# Patient Record
Sex: FEMALE | Race: WHITE | HISPANIC OR LATINO | Employment: UNEMPLOYED | URBAN - METROPOLITAN AREA
[De-identification: names, ages, dates, MRNs, and addresses within clinical notes are randomized per-mention and may not be internally consistent; named-entity substitution may affect disease eponyms.]

---

## 2022-03-16 ENCOUNTER — APPOINTMENT (OUTPATIENT)
Dept: OBGYN | Facility: CLINIC | Age: 22
End: 2022-03-16

## 2022-03-16 ENCOUNTER — GYNECOLOGY VISIT (OUTPATIENT)
Dept: OBGYN | Facility: CLINIC | Age: 22
End: 2022-03-16

## 2022-03-16 VITALS — WEIGHT: 201 LBS | SYSTOLIC BLOOD PRESSURE: 102 MMHG | DIASTOLIC BLOOD PRESSURE: 64 MMHG

## 2022-03-16 DIAGNOSIS — Z32.01 ENCOUNTER FOR PREGNANCY TEST, RESULT POSITIVE: ICD-10-CM

## 2022-03-16 DIAGNOSIS — N92.6 MISSED MENSES: ICD-10-CM

## 2022-03-16 PROCEDURE — 76830 TRANSVAGINAL US NON-OB: CPT | Mod: TC | Performed by: OBSTETRICS & GYNECOLOGY

## 2022-03-16 PROCEDURE — 99203 OFFICE O/P NEW LOW 30 MIN: CPT | Mod: 25 | Performed by: OBSTETRICS & GYNECOLOGY

## 2022-03-16 PROCEDURE — 8199 PREG CTR HIGH RISK PKG RATE (SYSTEM): Performed by: OBSTETRICS & GYNECOLOGY

## 2022-03-16 NOTE — PROGRESS NOTES
Cc: Confirmation of pregnancy    HPI:  The patient is a 21 y.o.  here for confirmation of pregnancy exam.     Fetal movement denies   Vaginal bleeding denies    Leakage of fluid denies    Cramping denies   Nausea/vomiting: reports mild nausea and vomiting  HA  denies   Dysuria  denies     Review of systems:  Pertinent positives documented in HPI and all other systems reviewed & are negative    Gyn History:   LMP unsure  No hx STDs    Pregnancy related complications:    OB History    Para Term  AB Living   2 1 1     1   SAB IAB Ectopic Molar Multiple Live Births             1      # Outcome Date GA Lbr Tim/2nd Weight Sex Delivery Anes PTL Lv   2 Current            1 Term 17 41w0d  2.722 kg (6 lb) M Vag-Spont  N NEYDA      Birth Comments: [place]     History reviewed. No pertinent past medical history.  History reviewed. No pertinent surgical history.  Social History     Socioeconomic History   • Marital status: Single     Spouse name: Not on file   • Number of children: Not on file   • Years of education: Not on file   • Highest education level: Not on file   Occupational History   • Not on file   Tobacco Use   • Smoking status: Never Smoker   • Smokeless tobacco: Never Used   Vaping Use   • Vaping Use: Never used   Substance and Sexual Activity   • Alcohol use: Not Currently   • Drug use: Never   • Sexual activity: Yes     Partners: Male     Birth control/protection: Pill   Other Topics Concern   • Not on file   Social History Narrative   • Not on file     Social Determinants of Health     Financial Resource Strain: Not on file   Food Insecurity: Not on file   Transportation Needs: Not on file   Physical Activity: Not on file   Stress: Not on file   Social Connections: Not on file   Intimate Partner Violence: Not on file   Housing Stability: Not on file     Family History   Problem Relation Age of Onset   • No Known Problems Mother    • No Known Problems Father      Allergies:   Allergies  as of 2022   • (No Known Allergies)       PE:    /64   Wt 91.2 kg (201 lb)     General:appears stated age, is in no apparent distress  Head: normocephalic, non-tender  Neck: neck is supple, no jugular venous distension  Abdomen: Bowel sounds positive, nondistended, soft, nontender x4, no rebound or guarding. No organomegaly. No masses.  Female GYN: normal female external genitalia without lesions     Skin: No rashes, or ulcers or lesions seen  Psychiatric: Patient shows appropriate affect, is alert and oriented x3, intact judgment and insight.    transvaginal scan and per my read:    Indication: missed menses, positive pregnancy test.   LMP unknown    Findings: morgan intrauterine pregnancy @ 12-6 weeks by CRL. Positive yolk sac. Positive fetal cardiac activity @ 153 BPM. Right ovary not seen. Left Ovary not seen. Cervical length 3.72cm.   No free fluid in the cul-de-sac.    Impression: viable IUP @ 12-6 weeks. EDC by US of 2022    DATIN2022 by todays US as per ACOG guidelines.    A/P:   1. Encounter for pregnancy test, result positive     2. Missed menses         # Newly diagnosed pregnancy  Discussed course of pregnancy; 20 week US normally when gender is known.   SAB precautions discussed  Increase water intake and encouraged healthy nutrition.  Begin prenatal vitamins.    Spent 30 minutes in face-to-face patient contact in which greater than 50% of that visit was spent in counseling/coordination of care of newly diagnosed pregnancy including medical and surgical options of care.    F/u in 4 weeks for new OB visit

## 2022-03-16 NOTE — NON-PROVIDER
Pt here for DUB.    Pt states she has a pain in her legs, also having nausea and vomiting   Good# [phone]  LMP: Sometime in December , 0w0d today.   Pharmacy confirmed

## 2022-03-17 ENCOUNTER — OFFICE VISIT (OUTPATIENT)
Dept: OBGYN | Facility: CLINIC | Age: 22
End: 2022-03-17
Payer: MEDICAID

## 2022-04-08 ENCOUNTER — INITIAL PRENATAL (OUTPATIENT)
Dept: OBGYN | Facility: CLINIC | Age: 22
End: 2022-04-08

## 2022-04-08 ENCOUNTER — HOSPITAL ENCOUNTER (OUTPATIENT)
Facility: MEDICAL CENTER | Age: 22
End: 2022-04-08
Attending: NURSE PRACTITIONER
Payer: COMMERCIAL

## 2022-04-08 VITALS — WEIGHT: 206.2 LBS | SYSTOLIC BLOOD PRESSURE: 108 MMHG | DIASTOLIC BLOOD PRESSURE: 66 MMHG

## 2022-04-08 DIAGNOSIS — Z34.82 ENCOUNTER FOR SUPERVISION OF OTHER NORMAL PREGNANCY, SECOND TRIMESTER: Primary | ICD-10-CM

## 2022-04-08 LAB
APPEARANCE UR: NORMAL
BILIRUB UR STRIP-MCNC: NORMAL MG/DL
COLOR UR AUTO: NORMAL
GLUCOSE UR STRIP.AUTO-MCNC: NEGATIVE MG/DL
KETONES UR STRIP.AUTO-MCNC: NEGATIVE MG/DL
LEUKOCYTE ESTERASE UR QL STRIP.AUTO: NORMAL
NITRITE UR QL STRIP.AUTO: NEGATIVE
PH UR STRIP.AUTO: 7 [PH] (ref 5–8)
PROT UR QL STRIP: NEGATIVE MG/DL
RBC UR QL AUTO: NEGATIVE
SP GR UR STRIP.AUTO: 1.02
UROBILINOGEN UR STRIP-MCNC: NORMAL MG/DL

## 2022-04-08 PROCEDURE — 81002 URINALYSIS NONAUTO W/O SCOPE: CPT | Performed by: NURSE PRACTITIONER

## 2022-04-08 PROCEDURE — 59402 PR NEW OB HIGH RISK: CPT | Performed by: NURSE PRACTITIONER

## 2022-04-08 RX ORDER — FOLIC ACID 1 MG/1
1 TABLET ORAL DAILY
COMMUNITY
End: 2022-09-08

## 2022-04-08 ASSESSMENT — ENCOUNTER SYMPTOMS
CONSTITUTIONAL NEGATIVE: 1
RESPIRATORY NEGATIVE: 1
MUSCULOSKELETAL NEGATIVE: 1
EYES NEGATIVE: 1
NEUROLOGICAL NEGATIVE: 1
GASTROINTESTINAL NEGATIVE: 1
PSYCHIATRIC NEGATIVE: 1
CARDIOVASCULAR NEGATIVE: 1

## 2022-04-08 NOTE — PROGRESS NOTES
S:  Jes Milner is a 21 y.o.  who presents for her new OB exam.  She is 16w1d with and SHARONDA of Estimated Date of Delivery: 22 based off of US . She has no complaints.  She is currently not working. Discussed heavy lifting and chemical exposure. No ER visits or previous care in this pregnancy.     Unsure about AFP.  Declines CF.  Denies VB, LOF, or cramping.  Denies dysuria, vaginal DC. Reports some fetal movement.     Pt is single and lives with boyfriend and child. This is a different FOB than her first pregnancy.  Pregnancy is unplanned but desired.  She has a history of 1 full term  without any pregnancy or delivery complications.      Discussed diet and exercise during pregnancy. Encouraged good nutrition, and daily exercise including walking or swimming. Discussed expected weight gain during pregnancy. Discussed adequate hydration during pregnancy.    History reviewed. No pertinent past medical history.  Family History   Problem Relation Age of Onset   • No Known Problems Mother    • No Known Problems Father    • No Known Problems Sister    • No Known Problems Brother    • No Known Problems Brother      Social History     Socioeconomic History   • Marital status: Single     Spouse name: Not on file   • Number of children: Not on file   • Years of education: Not on file   • Highest education level: Not on file   Occupational History   • Not on file   Tobacco Use   • Smoking status: Never Smoker   • Smokeless tobacco: Never Used   Vaping Use   • Vaping Use: Never used   Substance and Sexual Activity   • Alcohol use: Not Currently   • Drug use: Never   • Sexual activity: Yes     Partners: Male     Birth control/protection: Pill   Other Topics Concern   • Not on file   Social History Narrative   • Not on file     Social Determinants of Health     Financial Resource Strain: Not on file   Food Insecurity: Not on file   Transportation Needs: Not on file   Physical Activity: Not on  file   Stress: Not on file   Social Connections: Not on file   Intimate Partner Violence: Not on file   Housing Stability: Not on file     OB History    Para Term  AB Living   2 1 1     1   SAB IAB Ectopic Molar Multiple Live Births             1      # Outcome Date GA Lbr Tim/2nd Weight Sex Delivery Anes PTL Lv   2 Current            1 Term 17 41w0d  2.722 kg (6 lb) M Vag-Spont  N NEYDA      Birth Comments: [place]       History of Varicella Virus: no  History of HSV I or II in self or partner: no  History of Thyroid problems: no    O:  /66   Wt 93.5 kg (206 lb 3.2 oz)    See Prenatal Physical.    Wet mount: deferred, no s/sx  Chaperone offered: yes    A:   1.  IUP @ 16w1d per US        2.  S=D        3.  See problem list below               Patient Active Problem List    Diagnosis Date Noted   • Encounter for supervision of other normal pregnancy, second trimester 2022         P:  1.  GC/CT & pap done        2.  Prenatal labs ordered - lab slip given        3.  Discussed PNV, diet, avoidances and adequate water intake        4.  NOB packet given        5.  Return to office in 4 wks        6.  Complete OB US in 3-4 wks        7.  AFP to be discussed next visit, unsure    Orders Placed This Encounter   • US-OB 2ND 3RD TRI COMPLETE   • PRENATAL PANEL 3+HIV+HCV   • URINALYSIS,CULTURE IF INDICATED   • URINE DRUG SCREEN W/CONF (AR)   • THINPREP RFLX HPV ASCUS W/CTNG   • POCT Urinalysis   • Prenatal MV-Min-Fe Fum-FA-DHA (PRENATAL 1 PO)   • folic acid (FOLVITE) 1 MG Tab       HPI    Review of Systems   Constitutional: Negative.    HENT: Negative.    Eyes: Negative.    Respiratory: Negative.    Cardiovascular: Negative.    Gastrointestinal: Negative.    Genitourinary: Negative.    Musculoskeletal: Negative.    Skin: Negative.    Neurological: Negative.    Endo/Heme/Allergies: Negative.    Psychiatric/Behavioral: Negative.    All other systems reviewed and are negative.          Objective      /66   Wt 93.5 kg (206 lb 3.2 oz)   LMP  (LMP Unknown)      Physical Exam  Vitals and nursing note reviewed. Exam conducted with a chaperone present.   Constitutional:       Appearance: Normal appearance. She is obese.   HENT:      Head: Normocephalic.      Nose: Nose normal.   Eyes:      Extraocular Movements: Extraocular movements intact.   Cardiovascular:      Rate and Rhythm: Normal rate and regular rhythm.      Pulses: Normal pulses.      Heart sounds: Normal heart sounds.   Pulmonary:      Effort: Pulmonary effort is normal.      Breath sounds: Normal breath sounds.   Abdominal:      Palpations: Abdomen is soft.   Genitourinary:     General: Normal vulva.      Rectum: Normal.   Musculoskeletal:         General: Normal range of motion.      Cervical back: Normal range of motion and neck supple.   Skin:     General: Skin is warm and dry.      Capillary Refill: Capillary refill takes less than 2 seconds.   Neurological:      General: No focal deficit present.      Mental Status: She is alert and oriented to person, place, and time.   Psychiatric:         Mood and Affect: Mood normal.         Behavior: Behavior normal.         Thought Content: Thought content normal.         Judgment: Judgment normal.             Assessment & Plan     1. Encounter for supervision of other normal pregnancy, second trimester  SHARONDA 9/22/2022 per US  - POCT Urinalysis  - PRENATAL PANEL 3+HIV+HCV; Future  - URINALYSIS,CULTURE IF INDICATED; Future  - URINE DRUG SCREEN W/CONF (AR); Future  - US-OB 2ND 3RD TRI COMPLETE; Future  - THINPREP RFLX HPV ASCUS W/CTNG; Future

## 2022-04-08 NOTE — PROGRESS NOTES
NOB today  LMP: unsure  Last pap: due for one today  Phone # [phone]  Pharmacy confirmed  On PNV  Cystic Fibrosis test offered.  Flu vaccine  C/o patient states that she has been having some nausea and vomiting. Patient states that she also has been having headahces

## 2022-04-09 DIAGNOSIS — Z34.82 ENCOUNTER FOR SUPERVISION OF OTHER NORMAL PREGNANCY, SECOND TRIMESTER: ICD-10-CM

## 2022-04-11 LAB
C TRACH DNA GENITAL QL NAA+PROBE: NEGATIVE
CYTOLOGY REG CYTOL: NORMAL
N GONORRHOEA DNA GENITAL QL NAA+PROBE: NEGATIVE
SPECIMEN SOURCE: NORMAL

## 2022-04-15 ENCOUNTER — HOSPITAL ENCOUNTER (OUTPATIENT)
Dept: LAB | Facility: MEDICAL CENTER | Age: 22
End: 2022-04-15
Attending: NURSE PRACTITIONER
Payer: COMMERCIAL

## 2022-04-15 DIAGNOSIS — Z34.82 ENCOUNTER FOR SUPERVISION OF OTHER NORMAL PREGNANCY, SECOND TRIMESTER: ICD-10-CM

## 2022-04-15 LAB
ABO GROUP BLD: NORMAL
APPEARANCE UR: CLEAR
BACTERIA #/AREA URNS HPF: ABNORMAL /HPF
BASOPHILS # BLD AUTO: 0.3 % (ref 0–1.8)
BASOPHILS # BLD: 0.03 K/UL (ref 0–0.12)
BILIRUB UR QL STRIP.AUTO: NEGATIVE
BLD GP AB SCN SERPL QL: NORMAL
COLOR UR: YELLOW
EOSINOPHIL # BLD AUTO: 0.05 K/UL (ref 0–0.51)
EOSINOPHIL NFR BLD: 0.6 % (ref 0–6.9)
EPI CELLS #/AREA URNS HPF: ABNORMAL /HPF
ERYTHROCYTE [DISTWIDTH] IN BLOOD BY AUTOMATED COUNT: 42.5 FL (ref 35.9–50)
GLUCOSE UR STRIP.AUTO-MCNC: NEGATIVE MG/DL
HBV SURFACE AG SER QL: ABNORMAL
HCT VFR BLD AUTO: 31.4 % (ref 37–47)
HCV AB SER QL: ABNORMAL
HGB BLD-MCNC: 10 G/DL (ref 12–16)
HIV 1+2 AB+HIV1 P24 AG SERPL QL IA: NORMAL
HYALINE CASTS #/AREA URNS LPF: ABNORMAL /LPF
IMM GRANULOCYTES # BLD AUTO: 0.03 K/UL (ref 0–0.11)
IMM GRANULOCYTES NFR BLD AUTO: 0.3 % (ref 0–0.9)
KETONES UR STRIP.AUTO-MCNC: NEGATIVE MG/DL
LEUKOCYTE ESTERASE UR QL STRIP.AUTO: ABNORMAL
LYMPHOCYTES # BLD AUTO: 2.21 K/UL (ref 1–4.8)
LYMPHOCYTES NFR BLD: 25.8 % (ref 22–41)
MCH RBC QN AUTO: 27.2 PG (ref 27–33)
MCHC RBC AUTO-ENTMCNC: 31.8 G/DL (ref 33.6–35)
MCV RBC AUTO: 85.6 FL (ref 81.4–97.8)
MICRO URNS: ABNORMAL
MONOCYTES # BLD AUTO: 0.42 K/UL (ref 0–0.85)
MONOCYTES NFR BLD AUTO: 4.9 % (ref 0–13.4)
NEUTROPHILS # BLD AUTO: 5.84 K/UL (ref 2–7.15)
NEUTROPHILS NFR BLD: 68.1 % (ref 44–72)
NITRITE UR QL STRIP.AUTO: NEGATIVE
NRBC # BLD AUTO: 0 K/UL
NRBC BLD-RTO: 0 /100 WBC
PH UR STRIP.AUTO: 6.5 [PH] (ref 5–8)
PLATELET # BLD AUTO: 304 K/UL (ref 164–446)
PMV BLD AUTO: 11.1 FL (ref 9–12.9)
PROT UR QL STRIP: NEGATIVE MG/DL
RBC # BLD AUTO: 3.67 M/UL (ref 4.2–5.4)
RBC # URNS HPF: ABNORMAL /HPF
RBC UR QL AUTO: NEGATIVE
RH BLD: NORMAL
RUBV AB SER QL: 37 IU/ML
SP GR UR STRIP.AUTO: 1.02
T PALLIDUM AB SER QL IA: ABNORMAL
UROBILINOGEN UR STRIP.AUTO-MCNC: 0.2 MG/DL
WBC # BLD AUTO: 8.6 K/UL (ref 4.8–10.8)
WBC #/AREA URNS HPF: ABNORMAL /HPF

## 2022-04-16 DIAGNOSIS — B96.89 BV (BACTERIAL VAGINOSIS): Primary | ICD-10-CM

## 2022-04-16 DIAGNOSIS — N76.0 BV (BACTERIAL VAGINOSIS): Primary | ICD-10-CM

## 2022-04-16 RX ORDER — METRONIDAZOLE 500 MG/1
500 TABLET ORAL 2 TIMES DAILY
Qty: 14 TABLET | Refills: 0 | Status: SHIPPED | OUTPATIENT
Start: 2022-04-16 | End: 2022-04-23

## 2022-04-17 LAB
AMPHET CTO UR CFM-MCNC: NEGATIVE NG/ML
BARBITURATES CTO UR CFM-MCNC: NEGATIVE NG/ML
BENZODIAZ CTO UR CFM-MCNC: NEGATIVE NG/ML
CANNABINOIDS CTO UR CFM-MCNC: NEGATIVE NG/ML
COCAINE CTO UR CFM-MCNC: NEGATIVE NG/ML
DRUG COMMENT 753798: NORMAL
METHADONE CTO UR CFM-MCNC: NEGATIVE NG/ML
OPIATES CTO UR CFM-MCNC: NEGATIVE NG/ML
PCP CTO UR CFM-MCNC: NEGATIVE NG/ML
PROPOXYPH CTO UR CFM-MCNC: NEGATIVE NG/ML

## 2022-04-19 ENCOUNTER — TELEPHONE (OUTPATIENT)
Dept: OBGYN | Facility: CLINIC | Age: 22
End: 2022-04-19
Payer: MEDICAID

## 2022-04-19 NOTE — TELEPHONE ENCOUNTER
----- Message from Radha Shaw C.N.M. sent at 4/16/2022 10:16 PM PDT -----  Please let patient know that I am sending an antibiotic for the BV, but that she has other bacteria in her pap also. Please check for symptoms related to PID- abnormal discharge, pelvic pain, or fever. If she has any of these, she needs to be seen       Called pt and informed pap is normal but shows positive for BV, explained this is not an STI. Pt informed she needs to start antibiotics. Should take the full Tx and advised to take meds with food but not with milk and to avoid alcohol and intercourse while on Tx. Pharmacy verified with pt. Pt agreed and verbalized understanding.   Pt informed there was also a different type of bacteria, pt denies any s/s of PID, advised pt if she develops any of the symptoms mentioned by provider above to call us to see her. Pt agreed and verbalize understanding.

## 2022-04-19 NOTE — TELEPHONE ENCOUNTER
----- Message from Radha Shaw C.N.M. sent at 4/16/2022 10:09 PM PDT -----  Please have patient start daily iron supplementation      Pt notified of need to start on Iron supplementation to take 325 mg daily. Recommended to take it with orange juice/vit c, to avoid dairy products 1hr before and 2hr after. Not to take with PNV. To increase water intake to decrease side effects of constipation. Pt verbalized understanding.

## 2022-05-06 ENCOUNTER — APPOINTMENT (OUTPATIENT)
Dept: RADIOLOGY | Facility: IMAGING CENTER | Age: 22
End: 2022-05-06
Attending: NURSE PRACTITIONER

## 2022-05-06 ENCOUNTER — ROUTINE PRENATAL (OUTPATIENT)
Dept: OBGYN | Facility: CLINIC | Age: 22
End: 2022-05-06

## 2022-05-06 VITALS — SYSTOLIC BLOOD PRESSURE: 100 MMHG | DIASTOLIC BLOOD PRESSURE: 70 MMHG | WEIGHT: 211 LBS

## 2022-05-06 DIAGNOSIS — Z34.82 ENCOUNTER FOR SUPERVISION OF OTHER NORMAL PREGNANCY IN SECOND TRIMESTER: ICD-10-CM

## 2022-05-06 DIAGNOSIS — Z34.82 ENCOUNTER FOR SUPERVISION OF OTHER NORMAL PREGNANCY, SECOND TRIMESTER: ICD-10-CM

## 2022-05-06 PROCEDURE — 76805 OB US >/= 14 WKS SNGL FETUS: CPT | Mod: TC | Performed by: NURSE PRACTITIONER

## 2022-05-06 PROCEDURE — 90040 PR PRENATAL FOLLOW UP: CPT | Performed by: ADVANCED PRACTICE MIDWIFE

## 2022-05-06 RX ORDER — ONDANSETRON 4 MG/1
4 TABLET, FILM COATED ORAL EVERY 4 HOURS PRN
Qty: 20 TABLET | Refills: 0 | Status: SHIPPED | OUTPATIENT
Start: 2022-05-06 | End: 2022-09-08

## 2022-05-06 NOTE — NON-PROVIDER
Pt here today for OB follow up  Pt states she is still vomiting   Reports +FM  Good # [phone]  Pharmacy Confirmed.  Chaperone offered and not indicated.   Pt has u/s scheduled today

## 2022-06-03 ENCOUNTER — ROUTINE PRENATAL (OUTPATIENT)
Dept: OBGYN | Facility: CLINIC | Age: 22
End: 2022-06-03

## 2022-06-03 VITALS — DIASTOLIC BLOOD PRESSURE: 62 MMHG | SYSTOLIC BLOOD PRESSURE: 108 MMHG | WEIGHT: 209 LBS

## 2022-06-03 DIAGNOSIS — Z34.82 ENCOUNTER FOR SUPERVISION OF OTHER NORMAL PREGNANCY, SECOND TRIMESTER: ICD-10-CM

## 2022-06-03 PROCEDURE — 90040 PR PRENATAL FOLLOW UP: CPT | Performed by: NURSE PRACTITIONER

## 2022-06-03 NOTE — PROGRESS NOTES
SUBJECTIVE:  Pt is a 21 y.o.   at 24w1d  gestation. Presents today for follow-up prenatal care. Reports no issues at this time.  Reports  fetal movement. Denies regular cramping/contractions, bleeding or leaking of fluid. Denies dysuria, headaches, N/V. Generally feels well today.     OBJECTIVE:  - See prenatal vitals flow  -   Vitals:    22 0825   BP: 108/62   Weight: 94.8 kg (209 lb)                 ASSESSMENT:   - IUP at 24w1d    - S=D   -   Patient Active Problem List    Diagnosis Date Noted   • Encounter for supervision of other normal pregnancy, second trimester 2022         PLAN:  - S/sx pregnancy and labor warning signs vs general discomforts discussed  - Fetal movements and/or kick counts reviewed   - Adequate hydration reinforced  - Nutrition/exercise/vitamin education; continue PNV  - Third tri labs ordered   - Reviewed COVID-19 vaccination recommendations: pt reports she doesn't want to get it  - Reviewed US result   - Declines genetic testing   - Anticipatory guidance given  - RTC in 4 weeks for follow-up prenatal care

## 2022-06-03 NOTE — NON-PROVIDER
OB follow up   + FM, Isauro  No VB, LOF or UC's.  Phone # [phone]  Preferred pharmacy confirmed  3rd trimester lab orders pended and instructions given to patient  Pt denies any problems

## 2022-06-27 ENCOUNTER — ROUTINE PRENATAL (OUTPATIENT)
Dept: OBGYN | Facility: CLINIC | Age: 22
End: 2022-06-27
Payer: MEDICAID

## 2022-06-27 VITALS — WEIGHT: 212 LBS | DIASTOLIC BLOOD PRESSURE: 62 MMHG | SYSTOLIC BLOOD PRESSURE: 112 MMHG

## 2022-06-27 DIAGNOSIS — Z34.82 ENCOUNTER FOR SUPERVISION OF OTHER NORMAL PREGNANCY IN SECOND TRIMESTER: ICD-10-CM

## 2022-06-27 PROCEDURE — 90040 PR PRENATAL FOLLOW UP: CPT | Performed by: ADVANCED PRACTICE MIDWIFE

## 2022-06-27 NOTE — NON-PROVIDER
Pt here today for OB follow up  Pt states she has been experiencing dizzy spell, and her feet have also been bothering her   Reports +FM  Good #-[phone]  Pharmacy Confirmed.  Chaperone offered and not indicated.   Pt states she will get Labs done sometime this week   Pt given JO sheet, and instructions  Pt declines BTL  Pt would like to think about TDAP, ask at next visit.

## 2022-06-27 NOTE — LETTER
Cuente los Movimientos de fletcher Bebé  Otro paso importante para la ludy de fletcher bebé    Jes Tomas Niko Milner     [facility]            Dept: [phone]    ¿Cuántas semanas tiene de embarazo? 27w4d    Fecha cuando tiene que comenzar a contar el movimiento: 6/27/22                  Runge debe usar zoraida diagrama    Henna manera en que fletcher doctor puede controlar a ludy de fletcher bebé es sabiendo cuantas veces se mueve fletcher bebé en el útero, o por medio de las “pataditas”.  Usted podrá ayudarle a fletcher médico al usar cada día el siguiente diagrama.    Cada día, usted debe prestar atención a cuantas horas le lleva a fletcher bebé moverse 10 veces.  Comience a contar en la mañana, lo antes posible después de haberse levantado.    · Primeramente, escriba la hora en que se mueve fletcher bebé, hasta llegar a 10 veces.  · Colóquele un check o palomita a cada cuadrito cada vez que fletcher bebé se mueva hasta que complete 10 veces.  · Escriba la hora cuando termine de contar 10 veces en la última columna.  · Sume el total del tiempo que le llevó contar los 10 movimientos.  · Finalmente, complete el cuadrito de cuantas horas le llevó hacerlo.    Después de viviana contado los 10 movimientos, ya no tendrá que contar los demás movimientos por el ayden del día.  A la mañana siguiente, comience a contar de nuevo cuantas veces se mueve el bebé desde el momento en que se levante.    ¿Qué tendría que considerarse un “movimiento”?  Es difícil de decirlo porque es distinto de henna madre a otra, y de un embarazo a otro.  Lo importante es que cuente el movimiento de la misma manera ben el transcurso de fletcher embarazo.  Si tiene preguntas adicionales, pregúntele a fletcher doctor.    ¡Cuente cuidadosamente cada día!     MUESTRA:  Semana 28    ¿Cuántas horas le ha llevado sentir 10 movimientos?        Hora de Inicio     1     2     3     4     5     6     7     8     9     10   Hora de Finlizar   Rick. 8:20 ·  ·  ·  ·  ·  ·  ·  ·  ·  ·   11:40   Mar.               Mié.               Adeola.               Abrile.               Sáb.               Dom.                 IMPORTANTE:  Usted debe contactar a fletcher doctor si le lleva más de 2 horas sentir 10 movimientos de fletcher bebé.    Cada mañana, escriba la hora de inicio y comience a contar los movimientos de fletcher bebé.  Hágalo colocándole un check o palomita a cada cuadrito cada vez que sienta un movimiento de fletcher bebé.  Cuando haya sentido 10 “pataditas”, escriba la hora en que terminó de contar en la última columna.  Luego, complete en la cajita (arriba de la gini de check o palomita) el número total de horas que le llevó hacerlo.  Asegúrese de leer completamente las instrucciones en la página anterior.

## 2022-07-12 ENCOUNTER — ROUTINE PRENATAL (OUTPATIENT)
Dept: OBGYN | Facility: CLINIC | Age: 22
End: 2022-07-12

## 2022-07-12 VITALS — SYSTOLIC BLOOD PRESSURE: 102 MMHG | DIASTOLIC BLOOD PRESSURE: 74 MMHG | WEIGHT: 215 LBS

## 2022-07-12 DIAGNOSIS — Z34.83 ENCOUNTER FOR SUPERVISION OF OTHER NORMAL PREGNANCY IN THIRD TRIMESTER: Primary | ICD-10-CM

## 2022-07-12 PROCEDURE — 90715 TDAP VACCINE 7 YRS/> IM: CPT | Performed by: NURSE PRACTITIONER

## 2022-07-12 PROCEDURE — 90471 IMMUNIZATION ADMIN: CPT | Performed by: NURSE PRACTITIONER

## 2022-07-12 PROCEDURE — 90040 PR PRENATAL FOLLOW UP: CPT | Performed by: NURSE PRACTITIONER

## 2022-07-12 NOTE — PROGRESS NOTES
OB follow up   + fetal movement. Active  No VB, LOF or UC's.  Phone # [phone]  Preferred pharmacy confirmed.  No complaints as of today  TDAP today   Pt states she will try to get her labs ASAP

## 2022-08-10 ENCOUNTER — ROUTINE PRENATAL (OUTPATIENT)
Dept: OBGYN | Facility: CLINIC | Age: 22
End: 2022-08-10

## 2022-08-10 VITALS — WEIGHT: 213 LBS | DIASTOLIC BLOOD PRESSURE: 68 MMHG | SYSTOLIC BLOOD PRESSURE: 108 MMHG

## 2022-08-10 DIAGNOSIS — Z34.83 ENCOUNTER FOR SUPERVISION OF OTHER NORMAL PREGNANCY IN THIRD TRIMESTER: ICD-10-CM

## 2022-08-10 PROCEDURE — 90040 PR PRENATAL FOLLOW UP: CPT | Performed by: OBSTETRICS & GYNECOLOGY

## 2022-08-10 NOTE — PROGRESS NOTES
Pt doing well, no complaints. Has not gotten 3rd tri labs done yet. Thought she had to be fasting for 1 hour. Pt made aware she doesn't have to be fasting. Encouraged her to get this within 1 week. Maternal obesity. Will get growth US  -RTC in 2 weeks  -GBS btw 35-36 weeks  -PTL and FKC reviewed  -Encouraged 3rd tri labs

## 2022-08-23 ENCOUNTER — APPOINTMENT (OUTPATIENT)
Dept: RADIOLOGY | Facility: IMAGING CENTER | Age: 22
End: 2022-08-23
Attending: OBSTETRICS & GYNECOLOGY

## 2022-08-23 DIAGNOSIS — Z34.83 ENCOUNTER FOR SUPERVISION OF OTHER NORMAL PREGNANCY IN THIRD TRIMESTER: ICD-10-CM

## 2022-08-23 PROCEDURE — 76816 OB US FOLLOW-UP PER FETUS: CPT | Mod: TC | Performed by: PHYSICIAN ASSISTANT

## 2022-09-02 ENCOUNTER — HOSPITAL ENCOUNTER (OUTPATIENT)
Facility: MEDICAL CENTER | Age: 22
End: 2022-09-02
Attending: OBSTETRICS & GYNECOLOGY
Payer: COMMERCIAL

## 2022-09-02 ENCOUNTER — HOSPITAL ENCOUNTER (OUTPATIENT)
Dept: LAB | Facility: MEDICAL CENTER | Age: 22
End: 2022-09-02
Attending: NURSE PRACTITIONER
Payer: COMMERCIAL

## 2022-09-02 ENCOUNTER — ROUTINE PRENATAL (OUTPATIENT)
Dept: OBGYN | Facility: CLINIC | Age: 22
End: 2022-09-02
Payer: MEDICAID

## 2022-09-02 VITALS — WEIGHT: 219.9 LBS | SYSTOLIC BLOOD PRESSURE: 108 MMHG | DIASTOLIC BLOOD PRESSURE: 70 MMHG

## 2022-09-02 DIAGNOSIS — Z34.83 PRENATAL CARE, SUBSEQUENT PREGNANCY IN THIRD TRIMESTER: ICD-10-CM

## 2022-09-02 DIAGNOSIS — Z34.83 PRENATAL CARE, SUBSEQUENT PREGNANCY IN THIRD TRIMESTER: Primary | ICD-10-CM

## 2022-09-02 PROCEDURE — 90040 PR PRENATAL FOLLOW UP: CPT | Performed by: OBSTETRICS & GYNECOLOGY

## 2022-09-02 RX ORDER — FERROUS SULFATE 325(65) MG
325 TABLET ORAL DAILY
COMMUNITY
End: 2022-09-08

## 2022-09-02 NOTE — LETTER
September 2, 2022            Jes Milner is currently being cared for at [facility].  This patient is pregnant and may continue to work.  She should not lift greater than 20 pounds and requires frequent rest periods (10 minutes every two hours).        Thank you,          Siri Blanc D.O.

## 2022-09-02 NOTE — PROGRESS NOTES
Pt here today for OB follow up  GBS to be done today  Reports +FM  WT: 219.9 lb  BP: 108/70  Preferred pharmacy verified with pt.  Pt states no complaints or concerns today  Pt states she will get 3rd trimester labs next week. Lab slips reprinted for pt. Instructions given.   Pt states no complaints or concerns today  Good #  [phone]

## 2022-09-04 LAB — GP B STREP DNA SPEC QL NAA+PROBE: NEGATIVE

## 2022-09-06 ENCOUNTER — HOSPITAL ENCOUNTER (OUTPATIENT)
Dept: LAB | Facility: MEDICAL CENTER | Age: 22
End: 2022-09-06
Attending: NURSE PRACTITIONER
Payer: MEDICAID

## 2022-09-06 DIAGNOSIS — Z34.82 ENCOUNTER FOR SUPERVISION OF OTHER NORMAL PREGNANCY, SECOND TRIMESTER: ICD-10-CM

## 2022-09-06 LAB
BASOPHILS # BLD AUTO: 0.2 % (ref 0–1.8)
BASOPHILS # BLD: 0.02 K/UL (ref 0–0.12)
EOSINOPHIL # BLD AUTO: 0.06 K/UL (ref 0–0.51)
EOSINOPHIL NFR BLD: 0.7 % (ref 0–6.9)
ERYTHROCYTE [DISTWIDTH] IN BLOOD BY AUTOMATED COUNT: 43.4 FL (ref 35.9–50)
GLUCOSE 1H P 50 G GLC PO SERPL-MCNC: 113 MG/DL (ref 70–139)
HCT VFR BLD AUTO: 34.6 % (ref 37–47)
HGB BLD-MCNC: 10.9 G/DL (ref 12–16)
IMM GRANULOCYTES # BLD AUTO: 0.05 K/UL (ref 0–0.11)
IMM GRANULOCYTES NFR BLD AUTO: 0.6 % (ref 0–0.9)
LYMPHOCYTES # BLD AUTO: 1.68 K/UL (ref 1–4.8)
LYMPHOCYTES NFR BLD: 20.8 % (ref 22–41)
MCH RBC QN AUTO: 26.3 PG (ref 27–33)
MCHC RBC AUTO-ENTMCNC: 31.5 G/DL (ref 33.6–35)
MCV RBC AUTO: 83.6 FL (ref 81.4–97.8)
MONOCYTES # BLD AUTO: 0.43 K/UL (ref 0–0.85)
MONOCYTES NFR BLD AUTO: 5.3 % (ref 0–13.4)
NEUTROPHILS # BLD AUTO: 5.85 K/UL (ref 2–7.15)
NEUTROPHILS NFR BLD: 72.4 % (ref 44–72)
NRBC # BLD AUTO: 0 K/UL
NRBC BLD-RTO: 0 /100 WBC
PLATELET # BLD AUTO: 285 K/UL (ref 164–446)
PMV BLD AUTO: 11.1 FL (ref 9–12.9)
RBC # BLD AUTO: 4.14 M/UL (ref 4.2–5.4)
T PALLIDUM AB SER QL IA: NORMAL
WBC # BLD AUTO: 8.1 K/UL (ref 4.8–10.8)

## 2022-09-06 PROCEDURE — 86780 TREPONEMA PALLIDUM: CPT

## 2022-09-06 PROCEDURE — 82950 GLUCOSE TEST: CPT

## 2022-09-06 PROCEDURE — 85025 COMPLETE CBC W/AUTO DIFF WBC: CPT

## 2022-09-06 PROCEDURE — 36415 COLL VENOUS BLD VENIPUNCTURE: CPT

## 2022-09-08 ENCOUNTER — ROUTINE PRENATAL (OUTPATIENT)
Dept: OBGYN | Facility: CLINIC | Age: 22
End: 2022-09-08

## 2022-09-08 VITALS — SYSTOLIC BLOOD PRESSURE: 110 MMHG | DIASTOLIC BLOOD PRESSURE: 70 MMHG

## 2022-09-08 DIAGNOSIS — Z34.83 ENCOUNTER FOR SUPERVISION OF OTHER NORMAL PREGNANCY, THIRD TRIMESTER: ICD-10-CM

## 2022-09-08 PROCEDURE — 90040 PR PRENATAL FOLLOW UP: CPT | Performed by: NURSE PRACTITIONER

## 2022-09-08 NOTE — PROGRESS NOTES
SUBJECTIVE:  Pt is a 21 y.o.   at 38w0d  gestation. Presents today for follow-up prenatal care. Reports no issues at this time.  Reports good  fetal movement. Denies regular cramping/contractions, bleeding or leaking of fluid. Denies dysuria, headaches, N/V. Generally feels well today except irregular contractions and some mucous that does not itch or smell.      OBJECTIVE:  - See prenatal vitals flow  -   Vitals:    22 1436   BP: 110/70                 ASSESSMENT:   - IUP at 38w0d    - S=D   -   Patient Active Problem List    Diagnosis Date Noted    Encounter for supervision of other normal pregnancy, second trimester 2022         PLAN:  - S/sx pregnancy and labor warning signs vs general discomforts discussed  - Fetal movements and/or kick counts reviewed   - Adequate hydration reinforced  - Nutrition/exercise/vitamin education; continue PNV  - Desires IOL around 41 weeks  - Plans unsure for contraception Pp: handout given and reviewed  - Anticipatory guidance given  - RTC in 1 weeks for follow-up prenatal care

## 2022-09-08 NOTE — PROGRESS NOTES
Pt here today for OB follow up  Negativde GBS, pt informed  Reports +FM  WT: 217.2 lb  BP: 110/70  Pt states has been noticing vaginal mucus. States no other complaints or concerns today  Preferred pharmacy verified with pt.  1 hrt gtt WNL  Good # [phone]

## 2022-09-15 ENCOUNTER — ROUTINE PRENATAL (OUTPATIENT)
Dept: OBGYN | Facility: CLINIC | Age: 22
End: 2022-09-15
Payer: MEDICAID

## 2022-09-15 VITALS — DIASTOLIC BLOOD PRESSURE: 68 MMHG | WEIGHT: 218 LBS | SYSTOLIC BLOOD PRESSURE: 114 MMHG

## 2022-09-15 DIAGNOSIS — Z34.83 ENCOUNTER FOR SUPERVISION OF OTHER NORMAL PREGNANCY, THIRD TRIMESTER: ICD-10-CM

## 2022-09-15 PROCEDURE — 0502F SUBSEQUENT PRENATAL CARE: CPT | Performed by: NURSE PRACTITIONER

## 2022-09-15 PROCEDURE — 96372 THER/PROPH/DIAG INJ SC/IM: CPT | Performed by: NURSE PRACTITIONER

## 2022-09-15 PROCEDURE — 90686 IIV4 VACC NO PRSV 0.5 ML IM: CPT | Performed by: NURSE PRACTITIONER

## 2022-09-15 PROCEDURE — 90471 IMMUNIZATION ADMIN: CPT | Performed by: NURSE PRACTITIONER

## 2022-09-15 NOTE — PROGRESS NOTES
OB follow up   + FM, Nneka GARNETT, [place]  Phone # [phone]  Preferred pharmacy confirmed  GBS negative   Desires Flu Shot   Pt c/o irregular UC     Flu Shot given to patient on R Deltoid. Screening checklist reviewed with patient. VIS given.

## 2022-09-15 NOTE — PROGRESS NOTES
SUBJECTIVE:  Pt is a 21 y.o.   at 39w0d  gestation. Presents today for follow-up prenatal care. Reports no issues at this time.  Reports good  fetal movement. Denies regular cramping/contractions, bleeding or leaking of fluid. Denies dysuria, headaches, N/V. Generally feels well today except irregular contractions.      OBJECTIVE:  - See prenatal vitals flow  -   Vitals:    09/15/22 1441   BP: 114/68   Weight: 98.9 kg (218 lb)                 ASSESSMENT:   - IUP at 39w0d    - S=D   - 3-//-3  Patient Active Problem List    Diagnosis Date Noted    Encounter for supervision of other normal pregnancy, third trimester 2022         PLAN:  - S/sx pregnancy and labor warning signs vs general discomforts discussed  - Fetal movements and/or kick counts reviewed   - Adequate hydration reinforced  - Nutrition/exercise/vitamin education; continue PNV  - Membranes swept today   - Pt is very unsure about when she wants to get induced so will think about it and let me know Tues but for now will save a spot for 41 weeks  - Anticipatory guidance given  - RTC in 1 weeks for follow-up prenatal care

## 2022-09-17 ENCOUNTER — HOSPITAL ENCOUNTER (INPATIENT)
Facility: MEDICAL CENTER | Age: 22
LOS: 2 days | End: 2022-09-19
Attending: OBSTETRICS & GYNECOLOGY | Admitting: OBSTETRICS & GYNECOLOGY
Payer: MEDICAID

## 2022-09-17 PROBLEM — Z34.90 INTRAUTERINE PREGNANCY: Status: ACTIVE | Noted: 2022-09-17

## 2022-09-17 LAB
BASOPHILS # BLD AUTO: 0.2 % (ref 0–1.8)
BASOPHILS # BLD: 0.02 K/UL (ref 0–0.12)
CRYSTALS AMN MICRO: NORMAL
EOSINOPHIL # BLD AUTO: 0.09 K/UL (ref 0–0.51)
EOSINOPHIL NFR BLD: 1 % (ref 0–6.9)
ERYTHROCYTE [DISTWIDTH] IN BLOOD BY AUTOMATED COUNT: 41.7 FL (ref 35.9–50)
HCT VFR BLD AUTO: 34.2 % (ref 37–47)
HGB BLD-MCNC: 11 G/DL (ref 12–16)
HOLDING TUBE BB 8507: NORMAL
IMM GRANULOCYTES # BLD AUTO: 0.06 K/UL (ref 0–0.11)
IMM GRANULOCYTES NFR BLD AUTO: 0.7 % (ref 0–0.9)
LYMPHOCYTES # BLD AUTO: 1.83 K/UL (ref 1–4.8)
LYMPHOCYTES NFR BLD: 21.3 % (ref 22–41)
MCH RBC QN AUTO: 26.2 PG (ref 27–33)
MCHC RBC AUTO-ENTMCNC: 32.2 G/DL (ref 33.6–35)
MCV RBC AUTO: 81.4 FL (ref 81.4–97.8)
MONOCYTES # BLD AUTO: 0.57 K/UL (ref 0–0.85)
MONOCYTES NFR BLD AUTO: 6.6 % (ref 0–13.4)
NEUTROPHILS # BLD AUTO: 6.04 K/UL (ref 2–7.15)
NEUTROPHILS NFR BLD: 70.2 % (ref 44–72)
NRBC # BLD AUTO: 0 K/UL
NRBC BLD-RTO: 0 /100 WBC
PLATELET # BLD AUTO: 259 K/UL (ref 164–446)
PMV BLD AUTO: 11.8 FL (ref 9–12.9)
RBC # BLD AUTO: 4.2 M/UL (ref 4.2–5.4)
T PALLIDUM AB SER QL IA: NORMAL
WBC # BLD AUTO: 8.6 K/UL (ref 4.8–10.8)

## 2022-09-17 PROCEDURE — 86780 TREPONEMA PALLIDUM: CPT

## 2022-09-17 PROCEDURE — 59025 FETAL NON-STRESS TEST: CPT

## 2022-09-17 PROCEDURE — 99282 EMERGENCY DEPT VISIT SF MDM: CPT

## 2022-09-17 PROCEDURE — 85025 COMPLETE CBC W/AUTO DIFF WBC: CPT

## 2022-09-17 PROCEDURE — 36415 COLL VENOUS BLD VENIPUNCTURE: CPT

## 2022-09-17 PROCEDURE — 770002 HCHG ROOM/CARE - OB PRIVATE (112)

## 2022-09-17 PROCEDURE — 700105 HCHG RX REV CODE 258: Performed by: STUDENT IN AN ORGANIZED HEALTH CARE EDUCATION/TRAINING PROGRAM

## 2022-09-17 PROCEDURE — 89060 EXAM SYNOVIAL FLUID CRYSTALS: CPT

## 2022-09-17 PROCEDURE — 700105 HCHG RX REV CODE 258: Performed by: OBSTETRICS & GYNECOLOGY

## 2022-09-17 RX ORDER — IBUPROFEN 800 MG/1
800 TABLET ORAL
Status: COMPLETED | OUTPATIENT
Start: 2022-09-17 | End: 2022-09-18

## 2022-09-17 RX ORDER — MISOPROSTOL 200 UG/1
800 TABLET ORAL
Status: DISCONTINUED | OUTPATIENT
Start: 2022-09-17 | End: 2022-09-18 | Stop reason: HOSPADM

## 2022-09-17 RX ORDER — CARBOPROST TROMETHAMINE 250 UG/ML
250 INJECTION, SOLUTION INTRAMUSCULAR
Status: DISCONTINUED | OUTPATIENT
Start: 2022-09-17 | End: 2022-09-18 | Stop reason: HOSPADM

## 2022-09-17 RX ORDER — TERBUTALINE SULFATE 1 MG/ML
0.25 INJECTION, SOLUTION SUBCUTANEOUS
Status: DISCONTINUED | OUTPATIENT
Start: 2022-09-17 | End: 2022-09-18 | Stop reason: HOSPADM

## 2022-09-17 RX ORDER — METHYLERGONOVINE MALEATE 0.2 MG/ML
0.2 INJECTION INTRAVENOUS
Status: DISCONTINUED | OUTPATIENT
Start: 2022-09-17 | End: 2022-09-18 | Stop reason: HOSPADM

## 2022-09-17 RX ORDER — ROPIVACAINE HYDROCHLORIDE 2 MG/ML
INJECTION, SOLUTION EPIDURAL; INFILTRATION; PERINEURAL
Status: COMPLETED
Start: 2022-09-17 | End: 2022-09-18

## 2022-09-17 RX ORDER — SODIUM CHLORIDE, SODIUM LACTATE, POTASSIUM CHLORIDE, CALCIUM CHLORIDE 600; 310; 30; 20 MG/100ML; MG/100ML; MG/100ML; MG/100ML
INJECTION, SOLUTION INTRAVENOUS CONTINUOUS
Status: DISCONTINUED | OUTPATIENT
Start: 2022-09-17 | End: 2022-09-18

## 2022-09-17 RX ORDER — LIDOCAINE HYDROCHLORIDE 10 MG/ML
20 INJECTION, SOLUTION INFILTRATION; PERINEURAL
Status: DISCONTINUED | OUTPATIENT
Start: 2022-09-17 | End: 2022-09-18 | Stop reason: HOSPADM

## 2022-09-17 RX ORDER — ACETAMINOPHEN 500 MG
1000 TABLET ORAL
Status: COMPLETED | OUTPATIENT
Start: 2022-09-17 | End: 2022-09-18

## 2022-09-17 RX ORDER — OXYTOCIN 10 [USP'U]/ML
10 INJECTION, SOLUTION INTRAMUSCULAR; INTRAVENOUS
Status: DISCONTINUED | OUTPATIENT
Start: 2022-09-17 | End: 2022-09-18 | Stop reason: HOSPADM

## 2022-09-17 RX ADMIN — SODIUM CHLORIDE, POTASSIUM CHLORIDE, SODIUM LACTATE AND CALCIUM CHLORIDE 1000 ML: 600; 310; 30; 20 INJECTION, SOLUTION INTRAVENOUS at 11:23

## 2022-09-17 RX ADMIN — SODIUM CHLORIDE, POTASSIUM CHLORIDE, SODIUM LACTATE AND CALCIUM CHLORIDE: 600; 310; 30; 20 INJECTION, SOLUTION INTRAVENOUS at 21:31

## 2022-09-17 ASSESSMENT — PATIENT HEALTH QUESTIONNAIRE - PHQ9
SUM OF ALL RESPONSES TO PHQ9 QUESTIONS 1 AND 2: 0
1. LITTLE INTEREST OR PLEASURE IN DOING THINGS: NOT AT ALL
2. FEELING DOWN, DEPRESSED, IRRITABLE, OR HOPELESS: NOT AT ALL

## 2022-09-17 ASSESSMENT — LIFESTYLE VARIABLES
HAVE YOU EVER FELT YOU SHOULD CUT DOWN ON YOUR DRINKING: NO
TOTAL SCORE: 0
AVERAGE NUMBER OF DAYS PER WEEK YOU HAVE A DRINK CONTAINING ALCOHOL: 0
TOTAL SCORE: 0
HOW MANY TIMES IN THE PAST YEAR HAVE YOU HAD 5 OR MORE DRINKS IN A DAY: 0
EVER_SMOKED: NEVER
DOES PATIENT WANT TO STOP DRINKING: NO
ON A TYPICAL DAY WHEN YOU DRINK ALCOHOL HOW MANY DRINKS DO YOU HAVE: 0
HAVE PEOPLE ANNOYED YOU BY CRITICIZING YOUR DRINKING: NO
EVER FELT BAD OR GUILTY ABOUT YOUR DRINKING: NO
CONSUMPTION TOTAL: NEGATIVE
EVER HAD A DRINK FIRST THING IN THE MORNING TO STEADY YOUR NERVES TO GET RID OF A HANGOVER: NO
TOTAL SCORE: 0
ALCOHOL_USE: NO

## 2022-09-17 ASSESSMENT — COPD QUESTIONNAIRES
HAVE YOU SMOKED AT LEAST 100 CIGARETTES IN YOUR ENTIRE LIFE: NO/DON'T KNOW
DURING THE PAST 4 WEEKS HOW MUCH DID YOU FEEL SHORT OF BREATH: NONE/LITTLE OF THE TIME
IN THE PAST 12 MONTHS DO YOU DO LESS THAN YOU USED TO BECAUSE OF YOUR BREATHING PROBLEMS: DISAGREE/UNSURE
DO YOU EVER COUGH UP ANY MUCUS OR PHLEGM?: NO/ONLY WITH OCCASIONAL COLDS OR INFECTIONS

## 2022-09-17 ASSESSMENT — PAIN DESCRIPTION - PAIN TYPE: TYPE: ACUTE PAIN

## 2022-09-18 ENCOUNTER — ANESTHESIA (OUTPATIENT)
Dept: ANESTHESIOLOGY | Facility: MEDICAL CENTER | Age: 22
End: 2022-09-18
Payer: MEDICAID

## 2022-09-18 ENCOUNTER — ANESTHESIA EVENT (OUTPATIENT)
Dept: ANESTHESIOLOGY | Facility: MEDICAL CENTER | Age: 22
End: 2022-09-18
Payer: MEDICAID

## 2022-09-18 LAB
ERYTHROCYTE [DISTWIDTH] IN BLOOD BY AUTOMATED COUNT: 42.3 FL (ref 35.9–50)
HCT VFR BLD AUTO: 27.6 % (ref 37–47)
HGB BLD-MCNC: 9 G/DL (ref 12–16)
MCH RBC QN AUTO: 26.8 PG (ref 27–33)
MCHC RBC AUTO-ENTMCNC: 32.6 G/DL (ref 33.6–35)
MCV RBC AUTO: 82.1 FL (ref 81.4–97.8)
PLATELET # BLD AUTO: 200 K/UL (ref 164–446)
PMV BLD AUTO: 11.3 FL (ref 9–12.9)
RBC # BLD AUTO: 3.36 M/UL (ref 4.2–5.4)
WBC # BLD AUTO: 11.5 K/UL (ref 4.8–10.8)

## 2022-09-18 PROCEDURE — 59409 OBSTETRICAL CARE: CPT | Performed by: ADVANCED PRACTICE MIDWIFE

## 2022-09-18 PROCEDURE — 770002 HCHG ROOM/CARE - OB PRIVATE (112)

## 2022-09-18 PROCEDURE — 3E0E77Z INTRODUCTION OF ELECTROLYTIC AND WATER BALANCE SUBSTANCE INTO PRODUCTS OF CONCEPTION, VIA NATURAL OR ARTIFICIAL OPENING: ICD-10-PCS | Performed by: ADVANCED PRACTICE MIDWIFE

## 2022-09-18 PROCEDURE — 85027 COMPLETE CBC AUTOMATED: CPT

## 2022-09-18 PROCEDURE — 01967 NEURAXL LBR ANES VAG DLVR: CPT | Performed by: STUDENT IN AN ORGANIZED HEALTH CARE EDUCATION/TRAINING PROGRAM

## 2022-09-18 PROCEDURE — 700105 HCHG RX REV CODE 258: Performed by: OBSTETRICS & GYNECOLOGY

## 2022-09-18 PROCEDURE — 700101 HCHG RX REV CODE 250: Performed by: STUDENT IN AN ORGANIZED HEALTH CARE EDUCATION/TRAINING PROGRAM

## 2022-09-18 PROCEDURE — 700105 HCHG RX REV CODE 258: Performed by: STUDENT IN AN ORGANIZED HEALTH CARE EDUCATION/TRAINING PROGRAM

## 2022-09-18 PROCEDURE — 700111 HCHG RX REV CODE 636 W/ 250 OVERRIDE (IP): Performed by: STUDENT IN AN ORGANIZED HEALTH CARE EDUCATION/TRAINING PROGRAM

## 2022-09-18 PROCEDURE — A9270 NON-COVERED ITEM OR SERVICE: HCPCS | Performed by: OBSTETRICS & GYNECOLOGY

## 2022-09-18 PROCEDURE — 36415 COLL VENOUS BLD VENIPUNCTURE: CPT

## 2022-09-18 PROCEDURE — 303615 HCHG EPIDURAL/SPINAL ANESTHESIA FOR LABOR

## 2022-09-18 PROCEDURE — 59409 OBSTETRICAL CARE: CPT

## 2022-09-18 PROCEDURE — 700111 HCHG RX REV CODE 636 W/ 250 OVERRIDE (IP): Performed by: OBSTETRICS & GYNECOLOGY

## 2022-09-18 PROCEDURE — 700102 HCHG RX REV CODE 250 W/ 637 OVERRIDE(OP): Performed by: ADVANCED PRACTICE MIDWIFE

## 2022-09-18 PROCEDURE — 700102 HCHG RX REV CODE 250 W/ 637 OVERRIDE(OP): Performed by: OBSTETRICS & GYNECOLOGY

## 2022-09-18 PROCEDURE — A9270 NON-COVERED ITEM OR SERVICE: HCPCS | Performed by: ADVANCED PRACTICE MIDWIFE

## 2022-09-18 PROCEDURE — 304965 HCHG RECOVERY SERVICES

## 2022-09-18 RX ORDER — LIDOCAINE HYDROCHLORIDE AND EPINEPHRINE 15; 5 MG/ML; UG/ML
INJECTION, SOLUTION EPIDURAL
Status: COMPLETED | OUTPATIENT
Start: 2022-09-18 | End: 2022-09-18

## 2022-09-18 RX ORDER — SODIUM CHLORIDE, SODIUM LACTATE, POTASSIUM CHLORIDE, CALCIUM CHLORIDE 600; 310; 30; 20 MG/100ML; MG/100ML; MG/100ML; MG/100ML
INJECTION, SOLUTION INTRAVENOUS PRN
Status: DISCONTINUED | OUTPATIENT
Start: 2022-09-18 | End: 2022-09-19 | Stop reason: HOSPADM

## 2022-09-18 RX ORDER — ROPIVACAINE HYDROCHLORIDE 2 MG/ML
INJECTION, SOLUTION EPIDURAL; INFILTRATION; PERINEURAL CONTINUOUS
Status: DISCONTINUED | OUTPATIENT
Start: 2022-09-18 | End: 2022-09-18

## 2022-09-18 RX ORDER — SODIUM CHLORIDE, SODIUM LACTATE, POTASSIUM CHLORIDE, AND CALCIUM CHLORIDE .6; .31; .03; .02 G/100ML; G/100ML; G/100ML; G/100ML
1000 INJECTION, SOLUTION INTRAVENOUS
Status: COMPLETED | OUTPATIENT
Start: 2022-09-18 | End: 2022-09-18

## 2022-09-18 RX ORDER — ACETAMINOPHEN 500 MG
1000 TABLET ORAL EVERY 6 HOURS PRN
Status: DISCONTINUED | OUTPATIENT
Start: 2022-09-18 | End: 2022-09-19 | Stop reason: HOSPADM

## 2022-09-18 RX ORDER — DOCUSATE SODIUM 100 MG/1
100 CAPSULE, LIQUID FILLED ORAL 2 TIMES DAILY PRN
Status: DISCONTINUED | OUTPATIENT
Start: 2022-09-18 | End: 2022-09-19 | Stop reason: HOSPADM

## 2022-09-18 RX ORDER — ROPIVACAINE HYDROCHLORIDE 2 MG/ML
INJECTION, SOLUTION EPIDURAL; INFILTRATION
Status: COMPLETED | OUTPATIENT
Start: 2022-09-18 | End: 2022-09-18

## 2022-09-18 RX ORDER — LIDOCAINE HYDROCHLORIDE 20 MG/ML
INJECTION, SOLUTION EPIDURAL; INFILTRATION; INTRACAUDAL; PERINEURAL PRN
Status: DISCONTINUED | OUTPATIENT
Start: 2022-09-18 | End: 2022-09-18 | Stop reason: SURG

## 2022-09-18 RX ORDER — IBUPROFEN 800 MG/1
800 TABLET ORAL EVERY 8 HOURS PRN
Status: DISCONTINUED | OUTPATIENT
Start: 2022-09-18 | End: 2022-09-19 | Stop reason: HOSPADM

## 2022-09-18 RX ORDER — SODIUM CHLORIDE, SODIUM LACTATE, POTASSIUM CHLORIDE, AND CALCIUM CHLORIDE .6; .31; .03; .02 G/100ML; G/100ML; G/100ML; G/100ML
250 INJECTION, SOLUTION INTRAVENOUS PRN
Status: DISCONTINUED | OUTPATIENT
Start: 2022-09-18 | End: 2022-09-18 | Stop reason: HOSPADM

## 2022-09-18 RX ADMIN — ACETAMINOPHEN 1000 MG: 500 TABLET ORAL at 09:17

## 2022-09-18 RX ADMIN — SODIUM CHLORIDE, POTASSIUM CHLORIDE, SODIUM LACTATE AND CALCIUM CHLORIDE: 600; 310; 30; 20 INJECTION, SOLUTION INTRAVENOUS at 01:36

## 2022-09-18 RX ADMIN — ROPIVACAINE HYDROCHLORIDE 7 ML: 5 INJECTION, SOLUTION EPIDURAL; INFILTRATION; PERINEURAL at 00:35

## 2022-09-18 RX ADMIN — SODIUM CHLORIDE, POTASSIUM CHLORIDE, SODIUM LACTATE AND CALCIUM CHLORIDE: 600; 310; 30; 20 INJECTION, SOLUTION INTRAVENOUS at 02:45

## 2022-09-18 RX ADMIN — ROPIVACAINE HYDROCHLORIDE: 2 INJECTION, SOLUTION EPIDURAL; INFILTRATION at 00:50

## 2022-09-18 RX ADMIN — EPHEDRINE SULFATE 5 MG: 50 INJECTION INTRAVENOUS at 01:41

## 2022-09-18 RX ADMIN — ACETAMINOPHEN 1000 MG: 500 TABLET ORAL at 20:16

## 2022-09-18 RX ADMIN — IBUPROFEN 800 MG: 800 TABLET, FILM COATED ORAL at 20:17

## 2022-09-18 RX ADMIN — LIDOCAINE HYDROCHLORIDE 5 ML: 20 INJECTION, SOLUTION EPIDURAL; INFILTRATION; INTRACAUDAL at 01:20

## 2022-09-18 RX ADMIN — OXYTOCIN 2000 ML/HR: 10 INJECTION, SOLUTION INTRAMUSCULAR; INTRAVENOUS at 07:44

## 2022-09-18 RX ADMIN — OXYTOCIN 125 ML/HR: 10 INJECTION, SOLUTION INTRAMUSCULAR; INTRAVENOUS at 08:45

## 2022-09-18 RX ADMIN — SODIUM CHLORIDE, POTASSIUM CHLORIDE, SODIUM LACTATE AND CALCIUM CHLORIDE: 600; 310; 30; 20 INJECTION, SOLUTION INTRAVENOUS at 01:16

## 2022-09-18 RX ADMIN — IBUPROFEN 800 MG: 800 TABLET, FILM COATED ORAL at 09:18

## 2022-09-18 RX ADMIN — SODIUM CHLORIDE, POTASSIUM CHLORIDE, SODIUM LACTATE AND CALCIUM CHLORIDE: 600; 310; 30; 20 INJECTION, SOLUTION INTRAVENOUS at 00:40

## 2022-09-18 RX ADMIN — LIDOCAINE HYDROCHLORIDE,EPINEPHRINE BITARTRATE 3 ML: 15; .005 INJECTION, SOLUTION EPIDURAL; INFILTRATION; INTRACAUDAL; PERINEURAL at 00:35

## 2022-09-18 ASSESSMENT — EDINBURGH POSTNATAL DEPRESSION SCALE (EPDS)
I HAVE FELT SAD OR MISERABLE: NO, NOT AT ALL
I HAVE BEEN ABLE TO LAUGH AND SEE THE FUNNY SIDE OF THINGS: AS MUCH AS I ALWAYS COULD
I HAVE BLAMED MYSELF UNNECESSARILY WHEN THINGS WENT WRONG: NO, NEVER
I HAVE BEEN SO UNHAPPY THAT I HAVE BEEN CRYING: NO, NEVER
I HAVE BEEN ANXIOUS OR WORRIED FOR NO GOOD REASON: NO, NOT AT ALL
I HAVE FELT SCARED OR PANICKY FOR NO GOOD REASON: NO, NOT AT ALL
I HAVE LOOKED FORWARD WITH ENJOYMENT TO THINGS: AS MUCH AS I EVER DID
THE THOUGHT OF HARMING MYSELF HAS OCCURRED TO ME: NEVER
I HAVE BEEN SO UNHAPPY THAT I HAVE HAD DIFFICULTY SLEEPING: NOT AT ALL

## 2022-09-18 ASSESSMENT — PAIN DESCRIPTION - PAIN TYPE
TYPE: ACUTE PAIN

## 2022-09-18 ASSESSMENT — PATIENT HEALTH QUESTIONNAIRE - PHQ9
2. FEELING DOWN, DEPRESSED, IRRITABLE, OR HOPELESS: NOT AT ALL
SUM OF ALL RESPONSES TO PHQ9 QUESTIONS 1 AND 2: 0
1. LITTLE INTEREST OR PLEASURE IN DOING THINGS: NOT AT ALL

## 2022-09-18 NOTE — PROGRESS NOTES
39.2 weeks  pt presents for possible SROM check. Pt states that she has been leaking a CL fluid throughout the day since 1100. Pt denies any ctx at this time. VSS, EFMx2 applied. SSE performed, +pooling noted, Fern obtained and tubed to lab. SVE by RN 3-/-2, posterior. POC discussed, pt denies any further questions at this time.    Please see care of pt as noted on flowsheets, MAR and FHRT.     2230: Bedside SBAR report given to ILANA Bradford.

## 2022-09-18 NOTE — ANESTHESIA TIME REPORT
Anesthesia Start and Stop Event Times     Date Time Event    9/18/2022 0025 Ready for Procedure     0030 Anesthesia Start     0737 Anesthesia Stop        Responsible Staff  09/18/22    Name Role Begin End    Charisma Sykes M.D. Anesth 0030 0711    Clem Adair M.D. Anesth 0711 0737        Overtime Reason:  no overtime (within assigned shift)    Comments:

## 2022-09-18 NOTE — PROGRESS NOTES
2229 Amnioinfusion intiated by this RN. 200mL bolus running at 500 mL/hr.    2240 Report received from Romi PRECIADO.     2258 rate of amnioinfusion changed to 150mL/ hr per order from Neel DEL RIO.     0000 Patient requests epidural.    0020 Dr. Sykes at bedside for epidural placement. Timeout called, all agreed.    0118 Dr. Sykes called to bedside to assess patient pain.    0121 Dr. Sykes at bedside to give bolus dose of medication.    0350 SVE, see flowsheets.    0442 New bag for aminioinfusion replaced. Patient reports pressure. SVE by Gabrielle DEL RIO student, see flowsheets.    0444 Gabrielle DEL RIO student replaced FSE after original FSE fell out. Patient repositioned, tolerated well.     0600 Patient reports increased pressure, SVE, see flowsheets. Room set up for delivery.     0603 External fetal monitor applied. This RN at bedside monitoring fetal heart rate. Patient educated to call with increased rectal pressure.    0655 Report given to Emily PRECIADO.

## 2022-09-18 NOTE — NON-PROVIDER
S: Pt is resting comfortably in bed with epidural and family at bedside.      O:    Vitals:    09/18/22 0420 09/18/22 0430 09/18/22 0440 09/18/22 0450   BP: 119/73 115/66 101/56 102/50   Pulse: 77 81 89 93   Resp:       Temp:       TempSrc:       Weight:       Height:               FHTs:  Baseline 150, positive accels, non-recurrent early decels and non-recurring late decels resolved with position change, moderate variability        Ali Chukson: Contractions q 1-4 minutes, moderate to palpation, no pitocin        SVE: 7-8/90/0, IUPC and FSE in place    A/P:    1.  IUP @ 39w3d   2.  Cat II FHTs, reassuring for mod variability and accels    3.  Anticipate SVB        DEIRDRE Collier

## 2022-09-18 NOTE — PROGRESS NOTES
Called Dr. Parry in regards to patient not having a repeat CBC, no answer. Will try again.  Called Dr. Ware in regards to patient not having a repeat CBC order. Dr. Ware ordered to order a CBC 8 hours postpartum.

## 2022-09-18 NOTE — L&D DELIVERY NOTE
Admit Date:   2022      Pregnancy Complications: none  Medical Induction of Labor: no  GBS: negative  Anesthesia: epidural  Gestational Age at delivery: 39.3 weeks    Patient  progressed well after amnioinfusion and IV hydration to progress spontaneously in good pattern. She was noted to be c/0 station and room set up for delivery. She pushed well to deliver viable female infant in RINA position at 0737 with coached pushing efforts. Infant placed to maternal abdomen where she was dried and stimulated. A spontaneous cry was noted. Apgar 8, 9      Pitocin infused via IV bolus. After delayed cord clamping, cord was doubly clamped and cut by father. Signs of placenta separation noted and gentle cord traction was completed. Intact placenta was delivered spontaneously at 0743 where 3VC was noted. EBL 350ml. Fundus firm with minimal massage. Inspection of vulva and vagina was completed and perineum noted to have abrasion that is hemostatic. Routine postpartum care was initiated as mother and infant stable. Infant weight is pending.    Delivery of infant and placenta completed by CHERRIE GILMORE under my direct supervision.     Guillermo PECK CNM/ Dr. Salinas. , Attending

## 2022-09-18 NOTE — ED PROVIDER NOTES
Emergency Obstetric Consultation     Date of Service  2022    Reason for Consultation  Chief Complaint   Patient presents with    Rupture of Membranes     See RN note       History of Presenting Illness  21 y.o. female who presented 2022 with Reason for consult: rupture of membranesContractions: Perceived severity is mild.    Fetal activity: Perceived fetal activity is normal.    Prenatal complications: obesity  .    Review of Systems  Review of Systems   All other systems reviewed and are negative.    Obstetric History    OB History    Para Term  AB Living   2 1 1     1   SAB IAB Ectopic Molar Multiple Live Births             1      # Outcome Date GA Lbr Tim/2nd Weight Sex Delivery Anes PTL Lv   2 Current            1 Term 17 41w0d  2.722 kg (6 lb) M Vag-Spont  N NEYDA      Birth Comments: [place]       Gynecologic History  No LMP recorded (lmp unknown). Patient is pregnant.    Medical History  History reviewed. No pertinent past medical history.    Surgical History   has no past surgical history on file.    Family History  family history includes No Known Problems in her brother, brother, father, mother, and sister.    Social History   reports that she has never smoked. She has never used smokeless tobacco. She reports that she does not currently use alcohol. She reports that she does not use drugs.    Medications  Medications Prior to Admission   Medication Sig Dispense Refill Last Dose    Prenatal MV-Min-Fe Fum-FA-DHA (PRENATAL 1 PO) Take  by mouth.          Allergies  No Known Allergies    Physical Exam  Maternal Exam:  Uterine Assessment: Contraction strength is mild.  Abdomen: Patient reports no abdominal tenderness. Fetal presentation: vertex  Introitus: Normal vulva. Normal vagina.  Ferning test: positive.   Amniotic fluid character: clear.  Pelvis: adequate for delivery.    Cervix: Cervix evaluated by digital exam.    Genitourinary:     General: Normal vulva.        Laboratory  Recent Labs     220   WBC 8.6   RBC 4.20   HEMOGLOBIN 11.0*   HEMATOCRIT 34.2*   MCV 81.4   MCH 26.2*   MCHC 32.2*   RDW 41.7   PLATELETCT 259   MPV 11.8           Assessment & Plan  Assessment:  Early latent labor.   Membrane status: PROM.   Fetal well-being: indeterminate.   1. 20 yo  with IUP at 39.2 weeks  2. Cat II FHR tracing  3. Early labor  4. PROM      Plan:  1. Admit to L & D         PIO Luo

## 2022-09-18 NOTE — ANESTHESIA POSTPROCEDURE EVALUATION
Patient: Jes Milner    Procedure Summary     Date: 09/18/22 Room / Location:     Anesthesia Start: 0030 Anesthesia Stop: 0737    Procedure: Labor Epidural Diagnosis:     Scheduled Providers:  Responsible Provider: Clem Adair M.D.    Anesthesia Type: epidural ASA Status: 2          Final Anesthesia Type: epidural  Last vitals  BP   Blood Pressure: 109/64    Temp   36.1 °C (97 °F)    Pulse   74   Resp   18    SpO2   96 %      Anesthesia Post Evaluation    Patient location during evaluation: bedside  Patient participation: complete - patient participated  Level of consciousness: awake and alert    Airway patency: patent  Anesthetic complications: no  Cardiovascular status: hemodynamically stable  Respiratory status: acceptable  Hydration status: euvolemic    PONV: none    patient able to participate, but full recovery from regional anesthesia has not occurred and is not expected within the stipulated timeframe for the completion of the evaluation      No notable events documented.

## 2022-09-18 NOTE — PROGRESS NOTES
@ [phone] ILANA Diaz called and gave report.   @ 6743 patient was wheelchair to postpartum from [place] from labor and delivery.  Patient's vital signs were stable. Patient's bleeding light and fundus firm. Patient oriented to the room. Educated on writing on the infant's intake and out take board. Patient educated about pulling the cord in case of emergency. Used both the  on the phone and then ARLETTE Sands as translators. Patient was provided snacks. Meal tray was ordered by ILANA Diaz. Called food and nutrition to make sure that they received the order. Will monitor.

## 2022-09-18 NOTE — ANESTHESIA PROCEDURE NOTES
Epidural Block    Date/Time: 9/18/2022 12:35 AM  Performed by: Charisma Sykes M.D.  Authorized by: Charisma Sykes M.D.     Patient Location:  OB  Start Time:  9/18/2022 12:35 AM  End Time:  9/18/2022 12:45 AM  Reason for Block: labor analgesia    patient identified, IV checked, site marked, risks and benefits discussed, surgical consent, monitors and equipment checked, pre-op evaluation and timeout performed    Patient Position:  Sitting  Prep: ChloraPrep, patient draped and sterile technique    Monitoring:  Blood pressure, continuous pulse oximetry and heart rate  Approach:  Midline  Location:  L2-L3  Injection Technique:  SAMIR saline  Skin infiltration:  Lidocaine  Strength:  1%  Dose:  3ml  Needle Type:  Tuohy  Needle Gauge:  17 G  Needle Length:  3.5 in  Loss of resistance::  5.5  Catheter Size:  19 G  Catheter at Skin Depth:  12  Test Dose Result:  Negative   Attempt x1. Negative CSF or blood aspiration.

## 2022-09-18 NOTE — ANESTHESIA PREPROCEDURE EVALUATION
Date: 22  Procedure: Labor Epidural       22 yo woman  at 39w2d in labor.   Uncomplicated pregnancy. No known medical conditions.    Relevant Problems   No relevant active problems       Physical Exam    Airway   Mallampati: II  TM distance: >3 FB  Neck ROM: full       Cardiovascular - normal exam  Rhythm: regular  Rate: normal  (-) murmur     Dental - normal exam           Pulmonary - normal exam  Breath sounds clear to auscultation     Abdominal    Neurological - normal exam                 Anesthesia Plan    ASA 2       Plan - epidural   Neuraxial block will be labor analgesia            Induction: intravenous    Postoperative Plan: Postoperative administration of opioids is intended.    Pertinent diagnostic labs and testing reviewed    Informed Consent:    Anesthetic plan and risks discussed with patient.    Use of blood products discussed with: patient whom consented to blood products.

## 2022-09-18 NOTE — H&P
Admission History and Physical      Jes Milner is a 21 y.o. female  at 39w2d who presents for leaking of fluid and decreased fetal movement    Subjective:   positive - irregular  For CTXS.   positive Feels pain   positive for LOF  negative for vaginal bleeding.   decreased for fetal movement    She reports that she noticed leaking of fluid 10 hours prior to presentation. She reports that during that time, she also noticed that the baby was not moving as much.      Patient in triage for evaluation and some fetal tachycardia was noted with intermittent late and variable decelerations.     ROS: Pertinent items are noted in HPI.    History reviewed. No pertinent past medical history.  History reviewed. No pertinent surgical history.  OB History    Para Term  AB Living   2 1 1     1   SAB IAB Ectopic Molar Multiple Live Births             1      # Outcome Date GA Lbr Tim/2nd Weight Sex Delivery Anes PTL Lv   2 Current            1 Term 17 41w0d  2.722 kg (6 lb) M Vag-Spont  N NEYDA      Birth Comments: [place]     Social History     Socioeconomic History    Marital status: Single     Spouse name: Not on file    Number of children: Not on file    Years of education: Not on file    Highest education level: Not on file   Occupational History    Not on file   Tobacco Use    Smoking status: Never    Smokeless tobacco: Never   Vaping Use    Vaping Use: Never used   Substance and Sexual Activity    Alcohol use: Not Currently    Drug use: Never    Sexual activity: Yes     Partners: Male     Birth control/protection: Pill   Other Topics Concern    Not on file   Social History Narrative    Not on file     Social Determinants of Health     Financial Resource Strain: Not on file   Food Insecurity: Not on file   Transportation Needs: Not on file   Physical Activity: Not on file   Stress: Not on file   Social Connections: Not on file   Intimate Partner Violence: Not on file   Housing  Stability: Not on file     Allergies: Patient has no known allergies.    Current Facility-Administered Medications:     LR infusion, , Intravenous, Continuous, Prisca Salinas M.D., Last Rate: 125 mL/hr at 09/17/22 2131, New Bag at 09/17/22 2131    lidocaine (XYLOCAINE) 1%  injection, 20 mL, Subcutaneous, Once PRN, Prisca Salinas M.D.    terbutaline (BRETHINE) injection 0.25 mg, 0.25 mg, Subcutaneous, Once PRN, Prisca Salinas M.D.    oxytocin (PITOCIN) infusion (for post delivery), 2,000 mL/hr, Intravenous, Once **FOLLOWED BY** oxytocin (PITOCIN) infusion (for post delivery), 125 mL/hr, Intravenous, Continuous, Prisca Salinas M.D.    oxytocin (PITOCIN) injection 10 Units, 10 Units, Intramuscular, Once PRN, Prisca Salinas M.D.    ibuprofen (MOTRIN) tablet 800 mg, 800 mg, Oral, Once PRN, Prisca Salinas M.D.    acetaminophen (TYLENOL) tablet 1,000 mg, 1,000 mg, Oral, Once PRN, Prisca Salinas M.D.    fentaNYL (SUBLIMAZE) injection 50 mcg, 50 mcg, Intravenous, Q HOUR PRN **OR** fentaNYL (SUBLIMAZE) injection 100 mcg, 100 mcg, Intravenous, Q HOUR PRN, Prisca Salinas M.D.    miSOPROStol (CYTOTEC) tablet 800 mcg, 800 mcg, Rectal, Once PRN, Prisca Salinas M.D.    methylergonovine (METHERGINE) injection 0.2 mg, 0.2 mg, Intramuscular, Once PRN, Prisca Salinas M.D.    carboPROST (HEMABATE) injection 250 mcg, 250 mcg, Intramuscular, Once PRN, Prisca Salinas M.D.    Prenatal care with [facility] starting at 12 weeks with following problems:  Patient Active Problem List    Diagnosis Date Noted    Intrauterine pregnancy 09/17/2022    Encounter for supervision of other normal pregnancy, third trimester 04/08/2022       Last US at 35 weeks  8/23/2022 10:49 AM     HISTORY/REASON FOR EXAM:  Growth US, obesity.     TECHNIQUE/EXAM DESCRIPTION: OB limited ultrasound.     COMPARISON:  5/6/2022 OB complete ultrasound.     FINDINGS:  Fetal Lie:  Vertex     Placenta (Location):  Posterior  Placenta Previa:  "No  Placental rdGrdrrdarddrderd:rd rd3rd Amniotic Fluid Volume:  JOLYNN = 21.71 cm     Fetal Heart Rate:  148 bpm     Cervical Length:  Not seen     No maternal adnexal mass is identified.     Fetal Biometry  BPD    8.69 cm, 35 weeks 1 day, (37.7%)  HC    32.02 cm, 36 weeks 1 day, (27.2%)  AC    33.01 cm, 36 weeks 6 days, (87.5%)  Femur Length    6.37 cm, 32 weeks 6 days, (1.7%)  Humerus Length    5.47 cm, 31 weeks 6 days, (2.3%)     EGA by this US:  34 weeks 4 days  SHARONDA by this US: 9/30/2022  SHARONDA by 1st US:  9/22/2022 (working)     Estimated Fetal Weight:  2728 grams  EFW Percentile: 47.5%     IMPRESSION:     Single intrauterine pregnancy of an estimated gestational age of 34 weeks 4 days with an estimated date of delivery of 9/30/2022.     Fetal biometry as above.        Objective:      /82   Pulse 82   Temp (!) 35.6 °C (96 °F) (Temporal)   Resp 18   Ht 1.6 m (5' 3\")   Wt 99.8 kg (220 lb)     General:   no acute distress, alert, cooperative   Skin:   normal   HEENT:  PERRLA   Lungs:   CTA bilateral   Heart:   S1, S2 normal, no murmur, click, rub or gallop, regular rate and rhythm, peripheral pulses very brisk, chest is clear without rales or wheezing, no pedal edema   Abdomen:   gravid, NT   EFW:  3800g   Pelvis:  adequate, diagnonal conjugate not met   FHT:  148 BPM   Uterine Size: S=D   Presentations: Cephalic   Cervix:     Dilation: 3-4    Effacement: 70    Station:  -2    Consistency: Soft    Position: Posterior     Lab Review  Lab:   Blood type: O     Recent Results (from the past 5880 hour(s))   POCT Urinalysis    Collection Time: 04/08/22  9:43 AM   Result Value Ref Range    POC Color      POC Appearance      POC Leukocyte Esterase Trace Negative    POC Nitrites Negative Negative    POC Urobiligen      POC Protein Negative Negative mg/dL    POC Urine PH 7.0 5.0 - 8.0    POC Blood Negative Negative    POC Specific Gravity 1.020 <1.005 - >1.030    POC Ketones Negative Negative mg/dL    POC Bilirubin      POC " Glucose Negative Negative mg/dL   THINPREP RFLX HPV ASCUS W/CTNG    Collection Time: 04/08/22 10:36 AM   Result Value Ref Range    Cytology Reg See Path Report     C. trachomatis by PCR Negative Negative    N. gonorrhoeae by PCR Negative Negative    Source Cervical    URINE DRUG SCREEN W/CONF (AR)    Collection Time: 04/15/22 10:55 AM   Result Value Ref Range    Urine Amphetamine-Methamphetam Negative Cutoff 300 ng/mL    Barbiturates Negative Cutoff 200 ng/mL    Benzodiazepines Negative Cutoff 200 ng/mL    Propoxyphene Negative Cutoff 300 ng/mL    Cocaine Metabolite Negative Cutoff 150 ng/mL    Methadone Negative Cutoff 150 ng/mL    Codeine-Morphine Negative Cutoff 300 ng/mL    Phencyclidine -Pcp Negative Cutoff 25 ng/mL    Cannabinoid Metab Negative Cutoff 20 ng/mL    Drug Comment Urine Drugs See Note    URINALYSIS,CULTURE IF INDICATED    Collection Time: 04/15/22 10:55 AM    Specimen: Urine   Result Value Ref Range    Color Yellow     Character Clear     Specific Gravity 1.018 <1.035    Ph 6.5 5.0 - 8.0    Glucose Negative Negative mg/dL    Ketones Negative Negative mg/dL    Protein Negative Negative mg/dL    Bilirubin Negative Negative    Urobilinogen, Urine 0.2 Negative    Nitrite Negative Negative    Leukocyte Esterase Trace (A) Negative    Occult Blood Negative Negative    Micro Urine Req Microscopic    PRENATAL PANEL 3+HIV+HCV    Collection Time: 04/15/22 10:55 AM   Result Value Ref Range    WBC 8.6 4.8 - 10.8 K/uL    RBC 3.67 (L) 4.20 - 5.40 M/uL    Hemoglobin 10.0 (L) 12.0 - 16.0 g/dL    Hematocrit 31.4 (L) 37.0 - 47.0 %    MCV 85.6 81.4 - 97.8 fL    MCH 27.2 27.0 - 33.0 pg    MCHC 31.8 (L) 33.6 - 35.0 g/dL    RDW 42.5 35.9 - 50.0 fL    Platelet Count 304 164 - 446 K/uL    MPV 11.1 9.0 - 12.9 fL    Neutrophils-Polys 68.10 44.00 - 72.00 %    Lymphocytes 25.80 22.00 - 41.00 %    Monocytes 4.90 0.00 - 13.40 %    Eosinophils 0.60 0.00 - 6.90 %    Basophils 0.30 0.00 - 1.80 %    Immature Granulocytes 0.30 0.00 -  0.90 %    Nucleated RBC 0.00 /100 WBC    Neutrophils (Absolute) 5.84 2.00 - 7.15 K/uL    Lymphs (Absolute) 2.21 1.00 - 4.80 K/uL    Monos (Absolute) 0.42 0.00 - 0.85 K/uL    Eos (Absolute) 0.05 0.00 - 0.51 K/uL    Baso (Absolute) 0.03 0.00 - 0.12 K/uL    Immature Granulocytes (abs) 0.03 0.00 - 0.11 K/uL    NRBC (Absolute) 0.00 K/uL    Rubella IgG Antibody 37.00 IU/mL    Hepatitis B Surface Antigen Non-Reactive Non-Reactive    Hepatitis C Antibody Non-Reactive Non-Reactive    Syphilis, Treponemal Qual Non-Reactive Non-Reactive   HIV AG/AB COMBO ASSAY SCREENING    Collection Time: 04/15/22 10:55 AM   Result Value Ref Range    HIV Ag/Ab Combo Assay Non-Reactive Non Reactive   OP Prenatal Panel-Blood Bank    Collection Time: 04/15/22 10:55 AM   Result Value Ref Range    ABO Grouping Only O     Rh Grouping Only POS     Antibody Screen Scrn NEG    URINE MICROSCOPIC (W/UA)    Collection Time: 04/15/22 10:55 AM   Result Value Ref Range    WBC 2-5 /hpf    RBC 0-2 /hpf    Bacteria Few (A) None /hpf    Epithelial Cells Few /hpf    Hyaline Cast 0-2 /lpf   GRP B STREP, BY PCR ([facility]OTH)    Collection Time: 09/02/22  1:06 PM    Specimen: Genital   Result Value Ref Range    Strep Gp B DNA PCR Negative Negative   CBC WITH DIFFERENTIAL    Collection Time: 09/06/22 10:59 AM   Result Value Ref Range    WBC 8.1 4.8 - 10.8 K/uL    RBC 4.14 (L) 4.20 - 5.40 M/uL    Hemoglobin 10.9 (L) 12.0 - 16.0 g/dL    Hematocrit 34.6 (L) 37.0 - 47.0 %    MCV 83.6 81.4 - 97.8 fL    MCH 26.3 (L) 27.0 - 33.0 pg    MCHC 31.5 (L) 33.6 - 35.0 g/dL    RDW 43.4 35.9 - 50.0 fL    Platelet Count 285 164 - 446 K/uL    MPV 11.1 9.0 - 12.9 fL    Neutrophils-Polys 72.40 (H) 44.00 - 72.00 %    Lymphocytes 20.80 (L) 22.00 - 41.00 %    Monocytes 5.30 0.00 - 13.40 %    Eosinophils 0.70 0.00 - 6.90 %    Basophils 0.20 0.00 - 1.80 %    Immature Granulocytes 0.60 0.00 - 0.90 %    Nucleated RBC 0.00 /100 WBC    Neutrophils (Absolute) 5.85 2.00 - 7.15 K/uL    Lymphs  (Absolute) 1.68 1.00 - 4.80 K/uL    Monos (Absolute) 0.43 0.00 - 0.85 K/uL    Eos (Absolute) 0.06 0.00 - 0.51 K/uL    Baso (Absolute) 0.02 0.00 - 0.12 K/uL    Immature Granulocytes (abs) 0.05 0.00 - 0.11 K/uL    NRBC (Absolute) 0.00 K/uL   T.PALLIDUM AB EIA    Collection Time: 09/06/22 10:59 AM   Result Value Ref Range    Syphilis, Treponemal Qual Non-Reactive Non-Reactive   GLUCOSE 1HR GESTATIONAL    Collection Time: 09/06/22 10:59 AM   Result Value Ref Range    Glucose, Post Dose 113 70 - 139 mg/dL   Ferning if suspected rupture of membranes (ROM)    Collection Time: 09/17/22  7:55 PM   Result Value Ref Range    Fern Test On Amniotic Fluid see below Not present   Hold Blood Bank Specimen (Not Tested)    Collection Time: 09/17/22  9:20 PM   Result Value Ref Range    Holding Tube - Bb DONE    CBC with differential    Collection Time: 09/17/22  9:20 PM   Result Value Ref Range    WBC 8.6 4.8 - 10.8 K/uL    RBC 4.20 4.20 - 5.40 M/uL    Hemoglobin 11.0 (L) 12.0 - 16.0 g/dL    Hematocrit 34.2 (L) 37.0 - 47.0 %    MCV 81.4 81.4 - 97.8 fL    MCH 26.2 (L) 27.0 - 33.0 pg    MCHC 32.2 (L) 33.6 - 35.0 g/dL    RDW 41.7 35.9 - 50.0 fL    Platelet Count 259 164 - 446 K/uL    MPV 11.8 9.0 - 12.9 fL    Neutrophils-Polys 70.20 44.00 - 72.00 %    Lymphocytes 21.30 (L) 22.00 - 41.00 %    Monocytes 6.60 0.00 - 13.40 %    Eosinophils 1.00 0.00 - 6.90 %    Basophils 0.20 0.00 - 1.80 %    Immature Granulocytes 0.70 0.00 - 0.90 %    Nucleated RBC 0.00 /100 WBC    Neutrophils (Absolute) 6.04 2.00 - 7.15 K/uL    Lymphs (Absolute) 1.83 1.00 - 4.80 K/uL    Monos (Absolute) 0.57 0.00 - 0.85 K/uL    Eos (Absolute) 0.09 0.00 - 0.51 K/uL    Baso (Absolute) 0.02 0.00 - 0.12 K/uL    Immature Granulocytes (abs) 0.06 0.00 - 0.11 K/uL    NRBC (Absolute) 0.00 K/uL   T. pallidum Ab, BEBA    Collection Time: 09/17/22  9:20 PM   Result Value Ref Range    Syphilis, Treponemal Qual Non-Reactive Non-Reactive          Assessment:   Jes Pope  Alf at 39w2d  Labor status: PROM and Early latent labor.  Cat II FHR tracing due to intermittent variable decelerations and intermittent late decelerations.   Obstetrical history significant for   Patient Active Problem List    Diagnosis Date Noted    Intrauterine pregnancy 09/17/2022    Encounter for supervision of other normal pregnancy, third trimester 04/08/2022   .      Plan:     1. Admit to L&D  2. GBS negative   3. Desires IV or epidural for pain relief. May have when appropriate  4. Plan at this time is internal monitoring. Start amnioinfusion for intermittent variable decelerations. Consider pitocin for augmentation if appropriate.       [facility]/ Dr. Salinas Attending

## 2022-09-18 NOTE — PROGRESS NOTES
0700 Report received from Mary Rn, POC discussed. Pt is feeling some increased pressure with UC's. RN will check with delivery team and check SVE.     0720 Pushing started.     0737 Delivery of viable baby girl, apgars 8/9, , no tears or lacerations. Mom and baby resting comfortably skin to skin.     0905  tablet used to order pts lunch and assess for pain. Pt requesting ibuprofen for her cramping and lower back pain. Order for breakfast placed.     0924 Report called to Makenzie on [facility] to get pt up and transfer to PP.

## 2022-09-19 ENCOUNTER — PHARMACY VISIT (OUTPATIENT)
Dept: PHARMACY | Facility: MEDICAL CENTER | Age: 22
End: 2022-09-19
Payer: COMMERCIAL

## 2022-09-19 VITALS
DIASTOLIC BLOOD PRESSURE: 70 MMHG | SYSTOLIC BLOOD PRESSURE: 112 MMHG | HEART RATE: 77 BPM | TEMPERATURE: 97 F | HEIGHT: 63 IN | RESPIRATION RATE: 18 BRPM | WEIGHT: 220 LBS | OXYGEN SATURATION: 98 % | BODY MASS INDEX: 38.98 KG/M2

## 2022-09-19 PROCEDURE — 700102 HCHG RX REV CODE 250 W/ 637 OVERRIDE(OP): Performed by: ADVANCED PRACTICE MIDWIFE

## 2022-09-19 PROCEDURE — RXMED WILLOW AMBULATORY MEDICATION CHARGE: Performed by: NURSE PRACTITIONER

## 2022-09-19 PROCEDURE — A9270 NON-COVERED ITEM OR SERVICE: HCPCS | Performed by: ADVANCED PRACTICE MIDWIFE

## 2022-09-19 RX ORDER — FERROUS SULFATE 325(65) MG
325 TABLET ORAL 2 TIMES DAILY
Qty: 60 TABLET | Refills: 3 | Status: SHIPPED | OUTPATIENT
Start: 2022-09-19

## 2022-09-19 RX ORDER — PSEUDOEPHEDRINE HCL 30 MG
100 TABLET ORAL 2 TIMES DAILY PRN
Qty: 60 CAPSULE | Refills: 1 | Status: SHIPPED | OUTPATIENT
Start: 2022-09-19 | End: 2022-11-01

## 2022-09-19 RX ORDER — ACETAMINOPHEN 500 MG
1000 TABLET ORAL EVERY 6 HOURS PRN
Qty: 60 TABLET | Refills: 1 | Status: SHIPPED | OUTPATIENT
Start: 2022-09-19 | End: 2023-04-03

## 2022-09-19 RX ORDER — IBUPROFEN 800 MG/1
800 TABLET ORAL EVERY 8 HOURS PRN
Qty: 60 TABLET | Refills: 1 | Status: SHIPPED | OUTPATIENT
Start: 2022-09-19 | End: 2022-12-30 | Stop reason: SDUPTHER

## 2022-09-19 RX ADMIN — ACETAMINOPHEN 1000 MG: 500 TABLET ORAL at 08:06

## 2022-09-19 NOTE — PROGRESS NOTES
Assessment done vital signs stable. Patient progressing according to plan of care. Fundus firm at the umbilicus with light lochia. Patient up voiding without difficulty. Ambulating with steady gait.  Breast and bottle feeding infant on demand. Family at bedside. Patient claims she will call for any needs or medications

## 2022-09-19 NOTE — DISCHARGE SUMMARY
Discharge Summary:      Jes Milner    Admit Date:   2022  Discharge Date:  2022     Admitting diagnosis:  Intrauterine pregnancy [Z34.90]  Discharge Diagnosis: Status post vaginal, spontaneous.  Pregnancy Complications: none  Tubal Ligation:  no        History:  History reviewed. No pertinent past medical history.  OB History    Para Term  AB Living   2 2 2     2   SAB IAB Ectopic Molar Multiple Live Births           0 2      # Outcome Date GA Lbr Tim/2nd Weight Sex Delivery Anes PTL Lv   2 Term 21 39w3d  3.07 kg (6 lb 12.3 oz) F Vag-Spont EPI N NEYDA   1 Term 17 41w0d  2.722 kg (6 lb) M Vag-Spont  N NEYDA      Birth Comments: [place]        Patient has no known allergies.  Patient Active Problem List    Diagnosis Date Noted    Intrauterine pregnancy 2022    Encounter for supervision of other normal pregnancy, third trimester 2022        Hospital Course:   21 y.o. , now para 2, was admitted with the above mentioned diagnosis, underwent induction of labor, vaginal, spontaneous. Patient postpartum course was unremarkable, with progressive advancement in diet , ambulation and toleration of oral analgesia. Patient without complaints today and desires discharge.      Vitals:    22 1000 22 1400 22 1800 22 0600   BP: 109/64 112/67 119/70 112/70   Pulse: 74 80 75 77   Resp:    Temp: 36.1 °C (97 °F) 36.4 °C (97.6 °F) 36.3 °C (97.4 °F) 36.1 °C (97 °F)   TempSrc: Temporal Temporal Temporal Temporal   SpO2: 96% 98% 98% 98%   Weight:       Height:           Current Facility-Administered Medications   Medication Dose    lactated ringers infusion      docusate sodium (COLACE) capsule 100 mg  100 mg    ibuprofen (MOTRIN) tablet 800 mg  800 mg    acetaminophen (TYLENOL) tablet 1,000 mg  1,000 mg    oxytocin (PITOCIN) infusion (for post delivery)  125 mL/hr       Exam:  Breast Exam: negative  Abdomen: Abdomen soft,  non-tender. BS normal. No masses,  No organomegaly  Fundus Non Tender: yes  Incision: none  Perineum: perineum intact  Extremity: extremities, peripheral pulses and reflexes normal, no edema, redness or tenderness in the calves or thighs     Labs:  Recent Labs     09/17/22 2120 09/18/22  1612   WBC 8.6 11.5*   RBC 4.20 3.36*   HEMOGLOBIN 11.0* 9.0*   HEMATOCRIT 34.2* 27.6*   MCV 81.4 82.1   MCH 26.2* 26.8*   MCHC 32.2* 32.6*   RDW 41.7 42.3   PLATELETCT 259 200   MPV 11.8 11.3        Activity:   Discharge to home  Pelvic Rest x 6 weeks    Assessment:  normal postpartum course  Discharge Assessment: No areas of skin breakdown/redness; surgical incision intact/healing     [facility]: [facility] in 5 weeks for vaginal  Declines birth control before d/c     Discharge Meds:   Current Outpatient Medications   Medication Sig Dispense Refill    acetaminophen (TYLENOL) 500 MG Tab Take 2 Tablets by mouth every 6 hours as needed for Mild Pain, Moderate Pain or Fever. 60 Tablet 1    docusate sodium 100 MG Cap Take 100 mg by mouth 2 times a day as needed for Constipation. 60 Capsule 1    ibuprofen (MOTRIN) 800 MG Tab Take 1 Tablet by mouth every 8 hours as needed for Mild Pain, Moderate Pain, Headache or Inflammation. 60 Tablet 1    ferrous sulfate 325 (65 Fe) MG tablet Take 1 Tablet by mouth 2 times a day. 60 Tablet 3       HERSON Kunz

## 2022-09-19 NOTE — PROGRESS NOTES
Received report from ILANA Banegas. Patient with complaints of cramping, medicated see MAR. Whiteboards updated, POC discussed. Patient encouraged to call with any needs and or concerns.

## 2022-09-19 NOTE — CARE PLAN
The patient is Stable - Low risk of patient condition declining or worsening    Shift Goals  Clinical Goals: VSS, fundus firm with light lochia    Progress made toward(s) clinical / shift goals: Patient VS stable, denies feeling dizzy when ambulating. Fundus firm with light lochia. Patient denies any heavy bleeding. Encouraged patient to call with any changes.     Patient is not progressing towards the following goals:

## 2022-09-19 NOTE — PROGRESS NOTES
In Arabic, discharge instructions reviewed, verbalized understanding, papers signed. Prescribed meds and information given, reviewed, verbalized understanding.

## 2022-09-19 NOTE — LACTATION NOTE
Mom is a 22 y/o P2 who delivered baby boy weighing 6 # 12.3 oz at 39.3 wks Mom reports darker and enlarged areolas during pregnancy. Mom denies any breast surgeries, diabetes, thyroid or fertility issues. Mom reports that this baby is feeding well. Mom had no trouble with breast feeding first baby. Mom states that she is breast feeding well but will give a bottle if she feels baby doesn't get enough. Reviewed normal  behavior and feeding patterns. Encouraged to do skin to skin during waking hours and feed when noting early feeding cues of licking lips, stirring in sleep and putting hand to mouth. Offer both breast at every feeding.   sent WIC information to [facility] on mom's behalf.   Mom bustos snot have a breast pump at home.    Breastfeeding plan:    Feed baby with feeding cues and at least a minimum of 8x/24 hours.  Expect cluster feeding as this is normal during early days of life and .    It is not recommended to let baby sleep longer than 3 hours between feedings and if sleepy, place skin to skin to promote feeding interest and milk production.    Make sure infant is getting enough by ensuring frequent feedings as well as looking for transitioning stools from dark meconium to bright yellow/green seedy loose stools by day 5.     Follow up with PEDS PCP as scheduled for weight checks and to make sure feeding is progressing appropriately.

## 2022-09-19 NOTE — DISCHARGE INSTRUCTIONS

## 2022-10-31 ENCOUNTER — APPOINTMENT (OUTPATIENT)
Dept: OBGYN | Facility: CLINIC | Age: 22
End: 2022-10-31

## 2022-11-01 ENCOUNTER — POST PARTUM (OUTPATIENT)
Dept: OBGYN | Facility: CLINIC | Age: 22
End: 2022-11-01

## 2022-11-01 DIAGNOSIS — Z30.09 ENCOUNTER FOR GENERAL COUNSELING AND ADVICE ON CONTRACEPTIVE MANAGEMENT: ICD-10-CM

## 2022-11-01 PROBLEM — Z34.83 ENCOUNTER FOR SUPERVISION OF OTHER NORMAL PREGNANCY, THIRD TRIMESTER: Status: RESOLVED | Noted: 2022-04-08 | Resolved: 2022-11-01

## 2022-11-01 PROCEDURE — 0503F POSTPARTUM CARE VISIT: CPT

## 2022-11-01 RX ORDER — DOCUSATE SODIUM 100 MG/1
100 CAPSULE, LIQUID FILLED ORAL DAILY
Qty: 60 CAPSULE | Refills: 1 | Status: SHIPPED | OUTPATIENT
Start: 2022-11-01 | End: 2023-04-03

## 2022-11-01 ASSESSMENT — EDINBURGH POSTNATAL DEPRESSION SCALE (EPDS)
I HAVE FELT SAD OR MISERABLE: NO, NOT AT ALL
I HAVE BEEN ANXIOUS OR WORRIED FOR NO GOOD REASON: NO, NOT AT ALL
TOTAL SCORE: 3
I HAVE BLAMED MYSELF UNNECESSARILY WHEN THINGS WENT WRONG: NO, NEVER
I HAVE FELT SCARED OR PANICKY FOR NO GOOD REASON: NO, NOT AT ALL
THE THOUGHT OF HARMING MYSELF HAS OCCURRED TO ME: NEVER
I HAVE LOOKED FORWARD WITH ENJOYMENT TO THINGS: AS MUCH AS I EVER DID
I HAVE BEEN ABLE TO LAUGH AND SEE THE FUNNY SIDE OF THINGS: AS MUCH AS I ALWAYS COULD
I HAVE BEEN SO UNHAPPY THAT I HAVE BEEN CRYING: NO, NEVER
THINGS HAVE BEEN GETTING ON TOP OF ME: YES, MOST OF THE TIME I HAVEN'T BEEN ABLE TO COPE AT ALL
I HAVE BEEN SO UNHAPPY THAT I HAVE HAD DIFFICULTY SLEEPING: NOT AT ALL

## 2022-11-01 NOTE — PROGRESS NOTES
Pt here today for postpartum exam.  Delivery Date: 22   Last Pap: 2022 WNL  Pharmacy verified   Currently: Breast feeding and bottle feeding   BCM: Pt interested in Mirena   LMP: 10/28/22  Good ph: [phone]  Wt: 210 lbs  BP: 118/70  Pt c/o lower back pain and L sciatic pain  EPDS - 3

## 2022-11-01 NOTE — PROGRESS NOTES
Subjective   Subjective:    Jes Milner is a 21 y.o.  female who presents for her postpartum exam. She had a  without complication. Her prenatal course was uncomplicated. Eating a regular diet without difficulty. Bowel movement are Abnormal - reports constipation despite eating fruit smoothies.  The patient is not having any pain. Reports resolution of lochia with resumption of menses on 10/28. Reports normal menstrual flow. Denies any vaginal symptoms.  She is breast and bottle feeding with formula. Denies any cracked/bleeding nipples or breast pain. She desires Mirena for her birth control method. Reports no sex prior to this appointment.  Denies any S/S of PP depression. Reports lower back pain and pain that radiates down the lateral side of her left leg. No difficulty walking. Had epidural during labor/birth.    Objective   Objective:      Objective : The patient appears well, alert and oriented x 3, in no acute distress.  /70  HEENT Exam: EOMI, pupils equal and round, no adenopathy or thyromegaly.  Lungs: Clear to Auscultation   Cardiac: S1 and S2 normal, no murmurs, or rubs   Abdomen: Soft without tenderness, guarding mass or organomegaly. Normoactive bowel sounds x4 quadrants   Extremities: No edema, pulses equal  Neurological: Normal, No focal signs  Breast Exam: declined  Pelvic: declined  Pap: 2022: NILM    Lab:No results found for this or any previous visit (from the past 336 hour(s)).    Assessment   Assessment:    1. PP care of lactating woman   2. Exam WNL   3. Pap WNL on 2022  4. Desires contraception: Mirena  5. EPDS score: 3    1. Postpartum care and examination        2. Encounter for general counseling and advice on contraceptive management             Plan   Plan:    1. Breastfeeding support   2. Continue PNV   3. Contraceptive counseling - discussed R/B/A of Mirena IUD. Pt desires placement and will be set up for [facility]ler program.  4. Discussed diet,  exercise and resumption of sexual activity   5. Offered PT referral for back and leg pain but declines  6. Reviewed previous pap results and recommendations for follow up per [facility]: F/U 3 years  7. F/U at convenience for Mirena IUD placement  8.  F/u c PCP or [facility] as needed for primary care needs and in 1 year for annual GYN.    Orders Placed This Encounter    docusate sodium (COLACE) 100 MG Abner Lemos CKikiN.M.

## 2022-11-22 ENCOUNTER — GYNECOLOGY VISIT (OUTPATIENT)
Dept: OBGYN | Facility: CLINIC | Age: 22
End: 2022-11-22

## 2022-11-22 VITALS — SYSTOLIC BLOOD PRESSURE: 108 MMHG | DIASTOLIC BLOOD PRESSURE: 56 MMHG

## 2022-11-22 DIAGNOSIS — Z30.430 ENCOUNTER FOR IUD INSERTION: ICD-10-CM

## 2022-11-22 LAB
INT CON NEG: NEGATIVE
INT CON POS: POSITIVE
POC URINE PREGNANCY TEST: NEGATIVE

## 2022-11-22 PROCEDURE — 81025 URINE PREGNANCY TEST: CPT | Performed by: OBSTETRICS & GYNECOLOGY

## 2022-11-22 PROCEDURE — 58300 INSERT INTRAUTERINE DEVICE: CPT | Performed by: OBSTETRICS & GYNECOLOGY

## 2022-11-22 NOTE — PROGRESS NOTES
21 y.o.    Female presents here today for her IUD insertion:     She is approximately 2 months s/p . She desires Mirena IUD placement today.       Today the patient is counseled on the risks of IUD insertion. I also discussed with the patient the risk of infection on insertion, and had asked the patient to remain on pelvic rest for one week following the insertion. We also discussed the risk of IUD expulsion, the risk of uterine perforation and IUD migration. If the IUD does migrate the patient may require a separate procedure such as a laparoscopy to retrieve the migrated IUD. I also discussed the 1% risk of pregnancy with IUD use. Also discussed with patient today increased risk of ectopic pregnancy with IUD use. Discussed specific side effects of ParaGard IUD which can be increased vaginal bleeding during menses or increased dysmenorrhea. Also discussed today the possibility that IUD may need to be removed secondary to bleeding profile or pain. Also discussed were the possibility that partner can feel the IUD during intercourse. I also discussed the side effects of Mirena which can be amenorrhea or dysfunctional uterine bleeding or spotting.  Patient had the opportunity to ask questions regarding insertion, risks and benefits, all questions are answered in their entirety.  Informed consent is signed.    Procedure note  Urine pregnancy test is negative, informed consent was reviewed and signed today  The bimanual exam is performed the uterus is noted to be normal  in size and is mid position  A speculum was inserted into the vagina, the cervix was cleansed with Betadine swabs x3  The uterus was sounded to a depth of 8 centimeters  The IUD and sheath are inserted through the external and internal cervix os up to the level of the fundus, pulled back slightly, and deployed under sterile conditions   The strings trimmed to approximately 3 cm    The patient tolerated the procedure well    Patient is asked to  followup in 4 to 6 weeks for IUD check. The patient is asked to remain on pelvic rest for two weeks. She is instructed to use a second method of contraception until follow up visit and placement confirmed. She is asked to return to office sooner as needed for heavy vaginal bleeding, uncontrolled pain, fever, or any other concerns.

## 2022-12-30 ENCOUNTER — GYNECOLOGY VISIT (OUTPATIENT)
Dept: OBGYN | Facility: CLINIC | Age: 22
End: 2022-12-30

## 2022-12-30 VITALS
BODY MASS INDEX: 40.78 KG/M2 | HEIGHT: 61 IN | DIASTOLIC BLOOD PRESSURE: 60 MMHG | WEIGHT: 216 LBS | SYSTOLIC BLOOD PRESSURE: 122 MMHG

## 2022-12-30 DIAGNOSIS — Z97.5 IUD (INTRAUTERINE DEVICE) IN PLACE: ICD-10-CM

## 2022-12-30 DIAGNOSIS — Z30.431 IUD CHECK UP: ICD-10-CM

## 2022-12-30 PROCEDURE — 99212 OFFICE O/P EST SF 10 MIN: CPT

## 2022-12-30 RX ORDER — IBUPROFEN 800 MG/1
800 TABLET ORAL EVERY 8 HOURS PRN
Qty: 60 TABLET | Refills: 2 | Status: SHIPPED | OUTPATIENT
Start: 2022-12-30

## 2022-12-30 NOTE — PROGRESS NOTES
"S: Jes here for IUD check following placement on 11/22/22 by Dr. Salinas.   Bleeding: has been spotting daily since placement No LMP recorded (lmp unknown)..   Concerns/complaints: more headaches since placement, does not like the spotting/bleeding.   No other questions or complaints at this time.    O:   Objective : The patient appears well, alert and oriented x 3, in no acute distress.  Vitals:    05/03/22 1358   BP: 120/72   Weight: 48.1 kg (106 lb)   Height: 1.575 m (5' 2\")     Pelvic: External genitalia,urethral meatus, urethra, bladder and vagina normal. Cervix, uterus and adnexa intact and normal.  Anus and perineum normal. IUD strings present at the cervical os measuring approximately 4 cm.   Pap: NILM pap 4/9/22    A/P:  1. IUD in place, visualized today  2. Reviewed the likelihood of resolution of spotting by 3 months, will try ibuprofen dosing to decrease spotting and treat HA. Will return in 2 months if she still doesn't like the IUD.     Encounter Diagnosis   Name Primary?    IUD check up         Follow up: 2 months if she is still struggling with IUD, otherwise annually for WWE, and PRN.     MELISSA EllisNAISHA.    "

## 2022-12-30 NOTE — PROGRESS NOTES
GYN visit for IUD check  Had Mirena IUD placement on 11/22/22 by Dr. Salinas  WT: 216.0 lb  BP: 122/60  LMP: unknown   Pt states she has been bleeding everyday since the IUD placement.  States some days can be light and other days heavy bleeding.   Erickson # [phone]

## 2023-04-05 ENCOUNTER — HOSPITAL ENCOUNTER (OUTPATIENT)
Facility: MEDICAL CENTER | Age: 23
End: 2023-04-05
Attending: NURSE PRACTITIONER

## 2023-04-05 ENCOUNTER — GYNECOLOGY VISIT (OUTPATIENT)
Dept: OBGYN | Facility: CLINIC | Age: 23
End: 2023-04-05

## 2023-04-05 VITALS — SYSTOLIC BLOOD PRESSURE: 112 MMHG | WEIGHT: 225.8 LBS | BODY MASS INDEX: 42.36 KG/M2 | DIASTOLIC BLOOD PRESSURE: 64 MMHG

## 2023-04-05 DIAGNOSIS — Z30.09 BIRTH CONTROL COUNSELING: ICD-10-CM

## 2023-04-05 DIAGNOSIS — Z30.432 ENCOUNTER FOR IUD REMOVAL: ICD-10-CM

## 2023-04-05 PROCEDURE — 87480 CANDIDA DNA DIR PROBE: CPT

## 2023-04-05 PROCEDURE — 87491 CHLMYD TRACH DNA AMP PROBE: CPT

## 2023-04-05 PROCEDURE — 58301 REMOVE INTRAUTERINE DEVICE: CPT | Performed by: NURSE PRACTITIONER

## 2023-04-05 PROCEDURE — 87591 N.GONORRHOEAE DNA AMP PROB: CPT

## 2023-04-05 PROCEDURE — 99213 OFFICE O/P EST LOW 20 MIN: CPT | Mod: 25 | Performed by: NURSE PRACTITIONER

## 2023-04-05 PROCEDURE — 87510 GARDNER VAG DNA DIR PROBE: CPT

## 2023-04-05 PROCEDURE — 87660 TRICHOMONAS VAGIN DIR PROBE: CPT

## 2023-04-05 RX ORDER — NORGESTIMATE AND ETHINYL ESTRADIOL 7DAYSX3 LO
1 KIT ORAL DAILY
Qty: 28 TABLET | Refills: 12 | Status: SHIPPED | OUTPATIENT
Start: 2023-04-05

## 2023-04-05 NOTE — PROGRESS NOTES
Pt here for IUD removal. It has made her spot a lot and have an increase in d/c and she wants it removed. She would like to try pills instead. She reports an increase in d/c that doesn't smell good but not itch or irritation.       IUD strings clasped and removed intact without incident    Quick start, missed pills, back up method for pills reviewed  Desires gc/ct testing, vag path collected  Pap uptodate  RTC PRN

## 2023-04-05 NOTE — PROGRESS NOTES
Patient here for GYN follow up for Mirena IUD removal   Mirena placed on 11/22/2022   Last seen on: 12/30/2022  LMP pt states has been spotting.   Last pap smear 4/8/2022 WNL  Pt states had bleeding x 3 months after insertions then stopped had a period lasting 15 days, has had to use a pad since due to yellowish discharge with a foul odor, also concerned about a lot of weight gain.

## 2023-04-06 LAB
AMBIGUOUS DTTM AMBI4: NORMAL
C TRACH DNA GENITAL QL NAA+PROBE: NEGATIVE
CANDIDA DNA VAG QL PROBE+SIG AMP: NEGATIVE
G VAGINALIS DNA VAG QL PROBE+SIG AMP: POSITIVE
N GONORRHOEA DNA GENITAL QL NAA+PROBE: NEGATIVE
SIGNIFICANT IND 70042: NORMAL
SITE SITE: NORMAL
SOURCE SOURCE: NORMAL
SPECIMEN SOURCE: NORMAL
T VAGINALIS DNA VAG QL PROBE+SIG AMP: NEGATIVE

## 2023-04-06 RX ORDER — METRONIDAZOLE 500 MG/1
500 TABLET ORAL 2 TIMES DAILY
Qty: 14 TABLET | Refills: 0 | Status: SHIPPED | OUTPATIENT
Start: 2023-04-06

## 2023-04-07 ENCOUNTER — TELEPHONE (OUTPATIENT)
Dept: OBGYN | Facility: CLINIC | Age: 23
End: 2023-04-07

## 2023-04-08 NOTE — TELEPHONE ENCOUNTER
----- Message from HERSON Kunz sent at 4/6/2023  7:05 PM PDT -----  Let pt know she has BV, I will call in rx now, no alcohol or milk and take for a full week.       Called pt and informed her test came back positive for BV, explained this is not an STI. Pt informed she needs to start antibiotics. Should take the full Tx and advised to take meds with food but not with milk and to avoid alcohol and intercourse while on Tx. Pharmacy verified with pt. Pt agreed and verbalized understanding.

## 2024-08-18 ENCOUNTER — PHARMACY VISIT (OUTPATIENT)
Dept: PHARMACY | Facility: MEDICAL CENTER | Age: 24
End: 2024-08-18
Payer: COMMERCIAL

## 2024-08-18 ENCOUNTER — HOSPITAL ENCOUNTER (EMERGENCY)
Facility: MEDICAL CENTER | Age: 24
End: 2024-08-18
Attending: EMERGENCY MEDICINE

## 2024-08-18 ENCOUNTER — APPOINTMENT (OUTPATIENT)
Dept: RADIOLOGY | Facility: MEDICAL CENTER | Age: 24
End: 2024-08-18
Attending: EMERGENCY MEDICINE

## 2024-08-18 VITALS
RESPIRATION RATE: 16 BRPM | OXYGEN SATURATION: 98 % | TEMPERATURE: 97.8 F | WEIGHT: 236.99 LBS | DIASTOLIC BLOOD PRESSURE: 59 MMHG | HEIGHT: 61 IN | BODY MASS INDEX: 44.75 KG/M2 | HEART RATE: 86 BPM | SYSTOLIC BLOOD PRESSURE: 125 MMHG

## 2024-08-18 DIAGNOSIS — R10.84 GENERALIZED ABDOMINAL PAIN: ICD-10-CM

## 2024-08-18 DIAGNOSIS — R11.2 NAUSEA AND VOMITING, UNSPECIFIED VOMITING TYPE: ICD-10-CM

## 2024-08-18 DIAGNOSIS — K80.20 CALCULUS OF GALLBLADDER WITHOUT CHOLECYSTITIS WITHOUT OBSTRUCTION: ICD-10-CM

## 2024-08-18 DIAGNOSIS — R94.31 ABNORMAL EKG: ICD-10-CM

## 2024-08-18 DIAGNOSIS — R19.7 DIARRHEA, UNSPECIFIED TYPE: ICD-10-CM

## 2024-08-18 DIAGNOSIS — R74.01 TRANSAMINITIS: ICD-10-CM

## 2024-08-18 DIAGNOSIS — K76.0 HEPATIC STEATOSIS: ICD-10-CM

## 2024-08-18 LAB
ALBUMIN SERPL BCP-MCNC: 4.7 G/DL (ref 3.2–4.9)
ALBUMIN/GLOB SERPL: 1.3 G/DL
ALP SERPL-CCNC: 87 U/L (ref 30–99)
ALT SERPL-CCNC: 94 U/L (ref 2–50)
ANION GAP SERPL CALC-SCNC: 17 MMOL/L (ref 7–16)
APPEARANCE UR: CLEAR
AST SERPL-CCNC: 49 U/L (ref 12–45)
BACTERIA #/AREA URNS HPF: NEGATIVE /HPF
BASOPHILS # BLD AUTO: 0.4 % (ref 0–1.8)
BASOPHILS # BLD: 0.03 K/UL (ref 0–0.12)
BILIRUB SERPL-MCNC: 0.5 MG/DL (ref 0.1–1.5)
BILIRUB UR QL STRIP.AUTO: NEGATIVE
BUN SERPL-MCNC: 10 MG/DL (ref 8–22)
CALCIUM ALBUM COR SERPL-MCNC: 8.7 MG/DL (ref 8.5–10.5)
CALCIUM SERPL-MCNC: 9.3 MG/DL (ref 8.5–10.5)
CHLORIDE SERPL-SCNC: 103 MMOL/L (ref 96–112)
CO2 SERPL-SCNC: 19 MMOL/L (ref 20–33)
COLOR UR: YELLOW
CREAT SERPL-MCNC: 0.45 MG/DL (ref 0.5–1.4)
EKG IMPRESSION: NORMAL
EOSINOPHIL # BLD AUTO: 0.02 K/UL (ref 0–0.51)
EOSINOPHIL NFR BLD: 0.3 % (ref 0–6.9)
EPI CELLS #/AREA URNS HPF: ABNORMAL /HPF
ERYTHROCYTE [DISTWIDTH] IN BLOOD BY AUTOMATED COUNT: 39.8 FL (ref 35.9–50)
FLUAV RNA SPEC QL NAA+PROBE: NEGATIVE
FLUBV RNA SPEC QL NAA+PROBE: NEGATIVE
GFR SERPLBLD CREATININE-BSD FMLA CKD-EPI: 137 ML/MIN/1.73 M 2
GLOBULIN SER CALC-MCNC: 3.7 G/DL (ref 1.9–3.5)
GLUCOSE SERPL-MCNC: 113 MG/DL (ref 65–99)
GLUCOSE UR STRIP.AUTO-MCNC: NEGATIVE MG/DL
HCG SERPL QL: NEGATIVE
HCT VFR BLD AUTO: 36.4 % (ref 37–47)
HGB BLD-MCNC: 11.7 G/DL (ref 12–16)
HYALINE CASTS #/AREA URNS LPF: ABNORMAL /LPF
IMM GRANULOCYTES # BLD AUTO: 0.05 K/UL (ref 0–0.11)
IMM GRANULOCYTES NFR BLD AUTO: 0.6 % (ref 0–0.9)
KETONES UR STRIP.AUTO-MCNC: NEGATIVE MG/DL
LEUKOCYTE ESTERASE UR QL STRIP.AUTO: NEGATIVE
LIPASE SERPL-CCNC: 22 U/L (ref 11–82)
LYMPHOCYTES # BLD AUTO: 1.49 K/UL (ref 1–4.8)
LYMPHOCYTES NFR BLD: 19.2 % (ref 22–41)
MCH RBC QN AUTO: 26.4 PG (ref 27–33)
MCHC RBC AUTO-ENTMCNC: 32.1 G/DL (ref 32.2–35.5)
MCV RBC AUTO: 82 FL (ref 81.4–97.8)
MICRO URNS: ABNORMAL
MONOCYTES # BLD AUTO: 0.4 K/UL (ref 0–0.85)
MONOCYTES NFR BLD AUTO: 5.1 % (ref 0–13.4)
NEUTROPHILS # BLD AUTO: 5.79 K/UL (ref 1.82–7.42)
NEUTROPHILS NFR BLD: 74.4 % (ref 44–72)
NITRITE UR QL STRIP.AUTO: NEGATIVE
NRBC # BLD AUTO: 0 K/UL
NRBC BLD-RTO: 0 /100 WBC (ref 0–0.2)
PH UR STRIP.AUTO: 7 [PH] (ref 5–8)
PLATELET # BLD AUTO: 359 K/UL (ref 164–446)
PMV BLD AUTO: 10.3 FL (ref 9–12.9)
POTASSIUM SERPL-SCNC: 4.2 MMOL/L (ref 3.6–5.5)
PROT SERPL-MCNC: 8.4 G/DL (ref 6–8.2)
PROT UR QL STRIP: 30 MG/DL
RBC # BLD AUTO: 4.44 M/UL (ref 4.2–5.4)
RBC # URNS HPF: ABNORMAL /HPF
RBC UR QL AUTO: NEGATIVE
RSV RNA SPEC QL NAA+PROBE: NEGATIVE
SARS-COV-2 RNA RESP QL NAA+PROBE: NOTDETECTED
SODIUM SERPL-SCNC: 139 MMOL/L (ref 135–145)
SP GR UR STRIP.AUTO: 1.02
TROPONIN T SERPL-MCNC: <6 NG/L (ref 6–19)
UROBILINOGEN UR STRIP.AUTO-MCNC: 0.2 MG/DL
WBC # BLD AUTO: 7.8 K/UL (ref 4.8–10.8)
WBC #/AREA URNS HPF: ABNORMAL /HPF

## 2024-08-18 PROCEDURE — 0241U HCHG SARS-COV-2 COVID-19 NFCT DS RESP RNA 4 TRGT ED POC: CPT

## 2024-08-18 PROCEDURE — 93005 ELECTROCARDIOGRAM TRACING: CPT | Performed by: EMERGENCY MEDICINE

## 2024-08-18 PROCEDURE — 71045 X-RAY EXAM CHEST 1 VIEW: CPT

## 2024-08-18 PROCEDURE — 700105 HCHG RX REV CODE 258: Performed by: EMERGENCY MEDICINE

## 2024-08-18 PROCEDURE — 96375 TX/PRO/DX INJ NEW DRUG ADDON: CPT

## 2024-08-18 PROCEDURE — 96374 THER/PROPH/DIAG INJ IV PUSH: CPT

## 2024-08-18 PROCEDURE — 83690 ASSAY OF LIPASE: CPT

## 2024-08-18 PROCEDURE — 84703 CHORIONIC GONADOTROPIN ASSAY: CPT

## 2024-08-18 PROCEDURE — 81001 URINALYSIS AUTO W/SCOPE: CPT

## 2024-08-18 PROCEDURE — 36415 COLL VENOUS BLD VENIPUNCTURE: CPT

## 2024-08-18 PROCEDURE — 80053 COMPREHEN METABOLIC PANEL: CPT

## 2024-08-18 PROCEDURE — 76705 ECHO EXAM OF ABDOMEN: CPT

## 2024-08-18 PROCEDURE — 84484 ASSAY OF TROPONIN QUANT: CPT

## 2024-08-18 PROCEDURE — 85025 COMPLETE CBC W/AUTO DIFF WBC: CPT

## 2024-08-18 PROCEDURE — 700111 HCHG RX REV CODE 636 W/ 250 OVERRIDE (IP): Mod: JZ | Performed by: EMERGENCY MEDICINE

## 2024-08-18 PROCEDURE — 93005 ELECTROCARDIOGRAM TRACING: CPT

## 2024-08-18 PROCEDURE — 99285 EMERGENCY DEPT VISIT HI MDM: CPT

## 2024-08-18 PROCEDURE — RXMED WILLOW AMBULATORY MEDICATION CHARGE: Performed by: EMERGENCY MEDICINE

## 2024-08-18 RX ORDER — DIPHENHYDRAMINE HYDROCHLORIDE 50 MG/ML
25 INJECTION INTRAMUSCULAR; INTRAVENOUS ONCE
Status: COMPLETED | OUTPATIENT
Start: 2024-08-18 | End: 2024-08-18

## 2024-08-18 RX ORDER — SODIUM CHLORIDE 9 MG/ML
1000 INJECTION, SOLUTION INTRAVENOUS ONCE
Status: COMPLETED | OUTPATIENT
Start: 2024-08-18 | End: 2024-08-18

## 2024-08-18 RX ORDER — ONDANSETRON 4 MG/1
4 TABLET, ORALLY DISINTEGRATING ORAL EVERY 6 HOURS PRN
Qty: 10 TABLET | Refills: 0 | Status: SHIPPED | OUTPATIENT
Start: 2024-08-18

## 2024-08-18 RX ORDER — DICYCLOMINE HCL 20 MG
20 TABLET ORAL EVERY 6 HOURS
Qty: 60 TABLET | Refills: 0 | Status: SHIPPED | OUTPATIENT
Start: 2024-08-18

## 2024-08-18 RX ORDER — KETOROLAC TROMETHAMINE 15 MG/ML
15 INJECTION, SOLUTION INTRAMUSCULAR; INTRAVENOUS ONCE
Status: COMPLETED | OUTPATIENT
Start: 2024-08-18 | End: 2024-08-18

## 2024-08-18 RX ORDER — METOCLOPRAMIDE HYDROCHLORIDE 5 MG/ML
10 INJECTION INTRAMUSCULAR; INTRAVENOUS ONCE
Status: COMPLETED | OUTPATIENT
Start: 2024-08-18 | End: 2024-08-18

## 2024-08-18 RX ORDER — LOPERAMIDE HCL 2 MG
2 CAPSULE ORAL 4 TIMES DAILY PRN
Qty: 30 CAPSULE | Refills: 0 | Status: SHIPPED | OUTPATIENT
Start: 2024-08-18

## 2024-08-18 RX ADMIN — METOCLOPRAMIDE 10 MG: 5 INJECTION, SOLUTION INTRAMUSCULAR; INTRAVENOUS at 11:11

## 2024-08-18 RX ADMIN — DIPHENHYDRAMINE HYDROCHLORIDE 25 MG: 50 INJECTION, SOLUTION INTRAMUSCULAR; INTRAVENOUS at 11:10

## 2024-08-18 RX ADMIN — SODIUM CHLORIDE 1000 ML: 9 INJECTION, SOLUTION INTRAVENOUS at 11:10

## 2024-08-18 RX ADMIN — KETOROLAC TROMETHAMINE 15 MG: 15 INJECTION, SOLUTION INTRAMUSCULAR; INTRAVENOUS at 12:04

## 2024-08-18 NOTE — DISCHARGE INSTRUCTIONS
You were seen in the ER for abdominal pain, nausea, vomiting, chest pain.  Thankfully your labs and imaging are all reassuring.  Your liver tests were slightly elevated and I got an ultrasound as you and I discussed in the ER.  It showed a stone in your gallbladder but this is unlikely to be the source of your discomfort today.  It also showed some fat around your liver and this can be followed up with your primary care physician.  I suspect that your symptoms are due to a virus.  I am giving you a prescription for medication to help with your symptoms but typically viruses resolve on their own.  Make sure to stay as well-hydrated as you can by drinking at least 8 ounces of nonalcoholic liquid every hour.  I have placed a referral both to general surgery to discuss the gallbladder stones that you have in a primary care physician.  I gave you a list of local clinics and our on-call general surgeons contact information, call tomorrow to schedule an appointment.  Return to the ER with new or worsening symptoms.  I hope you feel better soon!

## 2024-08-18 NOTE — ED PROVIDER NOTES
ED Provider Note    CHIEF COMPLAINT  Chief Complaint   Patient presents with    Abdominal Pain     Since last night around 0100, also c/o NV.     Chest Pain     R chest pain since last night       EXTERNAL RECORDS REVIEWED  Other none germane to today's visit    HPI/ROS  LIMITATION TO HISTORY   Select: Language Haitian,  Used   OUTSIDE HISTORIAN(S):  None    Jes Semajcameron Niko Milner is a 23 y.o. female who presents with a chief complaint of abdominal pain, left-sided chest pain, nausea, and vomiting that started this morning at 1:00 AM.  Patient states she was at a birthday party yesterday and had 2 beers.  She had about 6 episodes of nonbloody diarrhea yesterday but otherwise felt well however woke this morning at 1 AM with epigastric abdominal pain and multiple episodes of nonbloody, nonbilious emesis.  She then developed a mild headache and left-sided chest pain.  She has been experiencing the chest pain for the past 5 months but has not sought medical care.  She does not have any shortness of breath associated with the pain but feels as though somebody is sitting on her chest.  She does not have a history of high blood pressure or diabetes, no history of hyperlipidemia and she herself has never had a heart attack.  She denies chance of pregnancy.  She does not have any dysuria or hematuria.  She has not taken any medication for her symptoms.    PAST MEDICAL HISTORY       SURGICAL HISTORY  patient denies any surgical history    FAMILY HISTORY  Family History   Problem Relation Age of Onset    No Known Problems Mother     No Known Problems Father     No Known Problems Sister     No Known Problems Brother     No Known Problems Brother        SOCIAL HISTORY  Social History     Tobacco Use    Smoking status: Never    Smokeless tobacco: Never   Vaping Use    Vaping status: Never Used   Substance and Sexual Activity    Alcohol use: Not Currently    Drug use: Never    Sexual activity: Yes      "Partners: Male     Birth control/protection: Pill       CURRENT MEDICATIONS  Home Medications       Reviewed by Maryellen Macias R.N. (Registered Nurse) on 08/18/24 at 1005  Med List Status: Partial     Medication Last Dose Status   ferrous sulfate 325 (65 Fe) MG tablet  Active   ibuprofen (MOTRIN) 800 MG Tab  Active   metroNIDAZOLE (FLAGYL) 500 MG Tab  Active   Norgestim-Eth Estrad Triphasic 0.18/0.215/0.25 MG-25 MCG Tab  Active   Prenatal MV-Min-Fe Fum-FA-DHA (PRENATAL 1 PO)  Active                    ALLERGIES  No Known Allergies    PHYSICAL EXAM  VITAL SIGNS: /76   Pulse 87   Temp 36.6 °C (97.8 °F) (Temporal)   Resp 18   Ht 1.555 m (5' 1.22\")   Wt 107 kg (236 lb 15.9 oz)   SpO2 97%   BMI 44.46 kg/m²    Physical Exam  Vitals and nursing note reviewed.   Constitutional:       Appearance: She is well-developed.   HENT:      Head: Normocephalic and atraumatic.      Mouth/Throat:      Comments: Tacky mucous membranes  Eyes:      Extraocular Movements: Extraocular movements intact.      Pupils: Pupils are equal, round, and reactive to light.   Cardiovascular:      Rate and Rhythm: Normal rate and regular rhythm.   Pulmonary:      Effort: Pulmonary effort is normal.      Breath sounds: Normal breath sounds.   Abdominal:      General: Bowel sounds are normal.      Comments: Soft, epigastric tenderness.  Negative Eagle sign.  Negative McBurney's point tenderness.  No Rovsing sign.  No peritoneal signs.   Skin:     General: Skin is warm and dry.   Neurological:      General: No focal deficit present.      Mental Status: She is alert and oriented to person, place, and time.   Psychiatric:         Mood and Affect: Mood normal.         Behavior: Behavior normal.       EKG/LABS  Results for orders placed or performed during the hospital encounter of 08/18/24   CBC WITH DIFFERENTIAL   Result Value Ref Range    WBC 7.8 4.8 - 10.8 K/uL    RBC 4.44 4.20 - 5.40 M/uL    Hemoglobin 11.7 (L) 12.0 - 16.0 g/dL    " Hematocrit 36.4 (L) 37.0 - 47.0 %    MCV 82.0 81.4 - 97.8 fL    MCH 26.4 (L) 27.0 - 33.0 pg    MCHC 32.1 (L) 32.2 - 35.5 g/dL    RDW 39.8 35.9 - 50.0 fL    Platelet Count 359 164 - 446 K/uL    MPV 10.3 9.0 - 12.9 fL    Neutrophils-Polys 74.40 (H) 44.00 - 72.00 %    Lymphocytes 19.20 (L) 22.00 - 41.00 %    Monocytes 5.10 0.00 - 13.40 %    Eosinophils 0.30 0.00 - 6.90 %    Basophils 0.40 0.00 - 1.80 %    Immature Granulocytes 0.60 0.00 - 0.90 %    Nucleated RBC 0.00 0.00 - 0.20 /100 WBC    Neutrophils (Absolute) 5.79 1.82 - 7.42 K/uL    Lymphs (Absolute) 1.49 1.00 - 4.80 K/uL    Monos (Absolute) 0.40 0.00 - 0.85 K/uL    Eos (Absolute) 0.02 0.00 - 0.51 K/uL    Baso (Absolute) 0.03 0.00 - 0.12 K/uL    Immature Granulocytes (abs) 0.05 0.00 - 0.11 K/uL    NRBC (Absolute) 0.00 K/uL   COMP METABOLIC PANEL   Result Value Ref Range    Sodium 139 135 - 145 mmol/L    Potassium 4.2 3.6 - 5.5 mmol/L    Chloride 103 96 - 112 mmol/L    Co2 19 (L) 20 - 33 mmol/L    Anion Gap 17.0 (H) 7.0 - 16.0    Glucose 113 (H) 65 - 99 mg/dL    Bun 10 8 - 22 mg/dL    Creatinine 0.45 (L) 0.50 - 1.40 mg/dL    Calcium 9.3 8.5 - 10.5 mg/dL    Correct Calcium 8.7 8.5 - 10.5 mg/dL    AST(SGOT) 49 (H) 12 - 45 U/L    ALT(SGPT) 94 (H) 2 - 50 U/L    Alkaline Phosphatase 87 30 - 99 U/L    Total Bilirubin 0.5 0.1 - 1.5 mg/dL    Albumin 4.7 3.2 - 4.9 g/dL    Total Protein 8.4 (H) 6.0 - 8.2 g/dL    Globulin 3.7 (H) 1.9 - 3.5 g/dL    A-G Ratio 1.3 g/dL   LIPASE   Result Value Ref Range    Lipase 22 11 - 82 U/L   HCG QUAL SERUM   Result Value Ref Range    Beta-Hcg Qualitative Serum Negative Negative   URINALYSIS,CULTURE IF INDICATED    Specimen: Urine   Result Value Ref Range    Color Yellow     Character Clear     Specific Gravity 1.020 <1.035    Ph 7.0 5.0 - 8.0    Glucose Negative Negative mg/dL    Ketones Negative Negative mg/dL    Protein 30 (A) Negative mg/dL    Bilirubin Negative Negative    Urobilinogen, Urine 0.2 Negative    Nitrite Negative Negative     Leukocyte Esterase Negative Negative    Occult Blood Negative Negative    Micro Urine Req Microscopic    TROPONIN   Result Value Ref Range    Troponin T <6 6 - 19 ng/L   URINE MICROSCOPIC (W/UA)   Result Value Ref Range    WBC 0-2 /hpf    RBC 10-20 (A) /hpf    Bacteria Negative None /hpf    Epithelial Cells Few /hpf    Hyaline Cast 0-2 /lpf   ESTIMATED GFR   Result Value Ref Range    GFR (CKD-EPI) 137 >60 mL/min/1.73 m 2   EKG   Result Value Ref Range    Report       [facility] Emergency Dept[facility]    Test Date:  2024  Pt Name:    LEENANA SPARROW     Department: ER  MRN:        [id number]                      Room:  Gender:     Female                       Technician: [id number]  :        [id number]                   Requested By:ER TRIAGE PROTOCOL  Order #:    [id number]                    Reading MD: Archie Christensen MD    Measurements  Intervals                                Axis  Rate:       85                           P:          -30  ID:         [id number]                          QRS:        108  QRSD:       78                           T:          0  QT:         424  QTc:        505    Interpretive Statements  Sinus rhythm  Borderline right axis deviation  Borderline T abnormalities, diffuse leads  Prolonged QT interval  No previous ECG available for comparison  Electronically Signed On 2024 19:54:02 PDT by Archie Christensen MD     POC CoV-2, FLU A/B, RSV by PCR   Result Value Ref Range    POC Influenza A RNA, PCR Negative Negative    POC Influenza B RNA, PCR Negative Negative    POC RSV, by PCR Negative Negative    POC SARS-CoV-2, PCR NotDetected NotDetected     I have independently interpreted this EKG    RADIOLOGY/PROCEDURES   I have independently interpreted the diagnostic imaging associated with this visit and am waiting the final reading from the radiologist.   My preliminary interpretation is as follows: No lobar pneumonia    Radiologist interpretation:  US-RUQ   Final Result      1.  Echogenic liver, most commonly hepatic steatosis.      2. Cholelithiasis. No sonographic evidence of acute cholecystitis.         DX-CHEST-PORTABLE (1 VIEW)   Final Result         1. Low lung volume with hypoventilatory change and bibasilar atelectasis.        COURSE & MEDICAL DECISION MAKING    ASSESSMENT, COURSE AND PLAN  Care Narrative: This is a 23 year old female who is here with abdominal pain, diarrhea, vomiting, chest pain, and headache.    DDx includes, but is not limited to, ACS, GERD, dehydration, pregnancy, PE, pneumonia, viral syndrome, pancreatitis, cholecystitis, choledocolithiasis, duodenal ulcer, PUD.    Arrives AF with normal VS. Appears slightly dehydrated but non-toxic. She is awake, alert, has a normal and non-focal neurologic exam. Abdomen is soft with epigastric tenderness. She is not peritoneal. There is a negative Eagle's sign, negative McBurney's point tenderness, negative Rovsing sign.    Started on IV fluids and given a dose of benadryl, reglan, and toradol for headache as well as overall discomfort. Regarding her headache, this is not the worst headache of her life and there has been no thunderclap onset. She does not have any vision changes to suggest dural venous sinus thrombus. Low suspicion for CNS infection, intracranial mass, SAH. After the medication her headache resolved completely. Regarding her chest pain, she has been experiencing this for months. There is no exertional component. She does not have risk factors for ACS. She has normal HR and O2 sats. Her EKG does have some borderline T wave abnormalities but her troponin is normal. HEART score 1. She does take OCPs but has no tachycardia or hypoxia, no leg swelling, and has not had recent long distance travel or surgeries. She has no hemoptysis. Very low suspicion for PE and I will defer workup for the same.     CBC is without leukocytosis although she does have an anemia with H/H of 11.7/36.4. She has no evidence of active  bleeding, is not tachycardic or hypotensive. No indication this is the etiology of her symptoms. Metabolic panel with multiple mild abnormalities including bicarb of 19, AG of 17, glucose of 113, and AST/ALT of 49/94. Total bilirubin is normal. She has normal renal function and electrolytes. Lipase is normal. Troponin is negative and UA does not suggest infection. HCG is negative.    A CXR did not demonstrate acute abnormality. RUQ ultrasound obtained due to the transaminitis and although this revealed cholelithiasis there was no evidence for cholecystitis. Patient has a negative Eagle's sign and no leukocytosis. Low suspicion for cholecystitis. No sign of dilated CBD and with normal total bilirubin I have a low suspicion for choledocolithiasis.    Patient was reevaluated at bedside. She is resting comfortably and completely asymptomatic at present. We went over lab and imaging results. She does not meet criteria for hospitalization but will need close outpatient follow up with multiple specialists. I have placed referrals to GI for hepatic steatosis as seen on ultrasound, general surgery for cholelithiasis, cardiology for her abnormal EKG, and primary care. I sent in prescriptions for bentyl, imodium, prilosec, and zofran. We went over strict return precautions. VS remain normal and stable. Discharged in good and stable condition.    Hydration: Based on the patient's presentation of Dehydration the patient was given IV fluids. IV Hydration was used because oral hydration was not adequate alone. Upon recheck following hydration, the patient was improved.    ADDITIONAL PROBLEMS MANAGED  None    DISPOSITION AND DISCUSSIONS  I have discussed management of the patient with the following physicians and DIMPLE's:  None    Discussion of management with other QHP or appropriate source(s): None     Escalation of care considered, and ultimately not performed: N/A    Barriers to care at this time, including but not limited to:  Patient does not have established PCP.     Decision tools and prescription drugs considered including, but not limited to:  Bentyl, imodium, prilosec, zofran .    FINAL DIAGNOSIS  1. Transaminitis    2. Calculus of gallbladder without cholecystitis without obstruction    3. Hepatic steatosis    4. Nausea and vomiting, unspecified vomiting type    5. Diarrhea, unspecified type    6. Generalized abdominal pain       Electronically signed by: Archie Christensen M.D., 8/18/2024 10:40 AM

## 2024-08-18 NOTE — ED TRIAGE NOTES
"Chief Complaint   Patient presents with    Abdominal Pain     Since last night around 0100, also c/o NV.     Chest Pain     R chest pain since last night       22 yo female to triage for above complaint.     Pt is alert and oriented, speaking in full sentences, follows commands and responds appropriately to questions. Video  used for triage    Patient placed back in lobby and educated on triage process. Asked to inform RN of any changes.    /76   Pulse 87   Temp 36.6 °C (97.8 °F) (Temporal)   Resp 18   Ht 1.555 m (5' 1.22\")   Wt 107 kg (236 lb 15.9 oz)   SpO2 97%   BMI 44.46 kg/m²     "

## 2024-09-10 ENCOUNTER — TELEPHONE (OUTPATIENT)
Dept: HEALTH INFORMATION MANAGEMENT | Facility: OTHER | Age: 24
End: 2024-09-10

## 2024-09-14 ENCOUNTER — HOSPITAL ENCOUNTER (EMERGENCY)
Facility: MEDICAL CENTER | Age: 24
End: 2024-09-14
Attending: EMERGENCY MEDICINE

## 2024-09-14 VITALS
TEMPERATURE: 100 F | WEIGHT: 236.33 LBS | HEART RATE: 119 BPM | BODY MASS INDEX: 44.33 KG/M2 | RESPIRATION RATE: 20 BRPM | SYSTOLIC BLOOD PRESSURE: 116 MMHG | OXYGEN SATURATION: 92 % | DIASTOLIC BLOOD PRESSURE: 64 MMHG

## 2024-09-14 DIAGNOSIS — J02.0 STREPTOCOCCAL PHARYNGITIS: ICD-10-CM

## 2024-09-14 LAB
FLUAV RNA SPEC QL NAA+PROBE: NEGATIVE
FLUBV RNA SPEC QL NAA+PROBE: NEGATIVE
RSV RNA SPEC QL NAA+PROBE: NEGATIVE
S PYO DNA SPEC NAA+PROBE: DETECTED
SARS-COV-2 RNA RESP QL NAA+PROBE: NOTDETECTED

## 2024-09-14 PROCEDURE — 87651 STREP A DNA AMP PROBE: CPT

## 2024-09-14 PROCEDURE — 0241U HCHG SARS-COV-2 COVID-19 NFCT DS RESP RNA 4 TRGT ED POC: CPT

## 2024-09-14 PROCEDURE — 700102 HCHG RX REV CODE 250 W/ 637 OVERRIDE(OP): Performed by: EMERGENCY MEDICINE

## 2024-09-14 PROCEDURE — 99284 EMERGENCY DEPT VISIT MOD MDM: CPT

## 2024-09-14 PROCEDURE — A9270 NON-COVERED ITEM OR SERVICE: HCPCS | Performed by: EMERGENCY MEDICINE

## 2024-09-14 RX ORDER — DEXAMETHASONE 4 MG/1
10 TABLET ORAL ONCE
Status: COMPLETED | OUTPATIENT
Start: 2024-09-14 | End: 2024-09-14

## 2024-09-14 RX ORDER — AMOXICILLIN 500 MG/1
1000 CAPSULE ORAL DAILY
Qty: 20 CAPSULE | Refills: 0 | Status: ACTIVE | OUTPATIENT
Start: 2024-09-14 | End: 2024-09-24

## 2024-09-14 RX ORDER — ACETAMINOPHEN 325 MG/1
975 TABLET ORAL ONCE
Status: COMPLETED | OUTPATIENT
Start: 2024-09-14 | End: 2024-09-14

## 2024-09-14 RX ADMIN — DEXAMETHASONE 10 MG: 4 TABLET ORAL at 11:39

## 2024-09-14 RX ADMIN — ACETAMINOPHEN 975 MG: 325 TABLET ORAL at 11:40

## 2024-09-14 RX ADMIN — IBUPROFEN 800 MG: 200 TABLET, FILM COATED ORAL at 11:41

## 2024-09-14 ASSESSMENT — PAIN DESCRIPTION - PAIN TYPE: TYPE: ACUTE PAIN

## 2024-09-14 ASSESSMENT — FIBROSIS 4 INDEX: FIB4 SCORE: 0.34

## 2024-09-14 NOTE — ED TRIAGE NOTES
Chief Complaint   Patient presents with    Flu Like Symptoms     Symptoms x a couple days, taking ibuprofen without relief.   /74   Pulse (!) 123   Temp 38 °C (100.4 °F)   Resp (!) 22   Wt 107 kg (236 lb 5.3 oz)   SpO2 96%   Pt informed of wait times. Educated on triage process.  Asked to return to triage RN for any new or worsening of symptoms. Thanked for patience.

## 2024-09-14 NOTE — ED PROVIDER NOTES
ED Provider Note    CHIEF COMPLAINT  Chief Complaint   Patient presents with    Flu Like Symptoms       EXTERNAL RECORDS REVIEWED  Inpatient Notes I read the discharge summary from September 19, 2022 when the patient had a spontaneous vaginal delivery    HPI/ROS  LIMITATION TO HISTORY   Select: Language Upper sorbian,  Used   OUTSIDE HISTORIAN(S):  mother    Jes Milner is a 23 y.o. female who presents presents with fever, left sided headache, chills. Now sore throat. Ibuprofen not helping.     PAST MEDICAL HISTORY   cholelithiasis    SURGICAL HISTORY  patient denies any surgical history    FAMILY HISTORY  Family History   Problem Relation Age of Onset    No Known Problems Mother     No Known Problems Father     No Known Problems Sister     No Known Problems Brother     No Known Problems Brother        SOCIAL HISTORY  Social History     Tobacco Use    Smoking status: Never    Smokeless tobacco: Never   Vaping Use    Vaping status: Never Used   Substance and Sexual Activity    Alcohol use: Not Currently    Drug use: Never    Sexual activity: Yes     Partners: Male     Birth control/protection: Pill       CURRENT MEDICATIONS  Home Medications       Reviewed by Amy Souza R.N. (Registered Nurse) on 09/14/24 at 1009  Med List Status: Partial     Medication Last Dose Status   dicyclomine (BENTYL) 20 MG Tab  Active   ferrous sulfate 325 (65 Fe) MG tablet  Active   ibuprofen (MOTRIN) 800 MG Tab  Active   loperamide (IMODIUM) 2 MG Cap  Active   metroNIDAZOLE (FLAGYL) 500 MG Tab  Active   Norgestim-Eth Estrad Triphasic 0.18/0.215/0.25 MG-25 MCG Tab  Active   omeprazole (PRILOSEC) 20 MG delayed-release capsule  Active   ondansetron (ZOFRAN ODT) 4 MG TABLET DISPERSIBLE  Active   Prenatal MV-Min-Fe Fum-FA-DHA (PRENATAL 1 PO)  Active                    ALLERGIES  No Known Allergies    PHYSICAL EXAM  VITAL SIGNS: /74   Pulse (!) 123   Temp 38 °C (100.4 °F) Comment: oral  Resp (!) 22    Wt 107 kg (236 lb 5.3 oz)   SpO2 96%   BMI 44.33 kg/m²      Throat: Erythema, no exudate, midline uvula  Lungs: Clear bilaterally    LABS    Labs Reviewed   GROUP A STREP BY PCR - Abnormal; Notable for the following components:       Result Value    Group A Strep by PCR DETECTED (*)     All other components within normal limits   POCT COV-2, FLU A/B, RSV BY PCR   POC COV-2, FLU A/B, RSV BY PCR               COURSE & MEDICAL DECISION MAKING    ASSESSMENT, COURSE AND PLAN  Care Narrative:     Patient presents with upper respiratory symptoms.  COVID flu RSV was ordered, PCR strep was ordered.      Patient's PCR strep is positive.  I have prescribed amoxicillin.  She was given Decadron 10 mg p.o., ibuprofen 800 mg p.o., Tylenol 975 mg p.o.              ADDITIONAL PROBLEMS MANAGED  Strep pharyngitis, headache    DISPOSITION AND DISCUSSIONS  I have discussed management of the patient with the following physicians and DIMPLE's: None    Discussion of management with other [facility] or appropriate source(s): None     Escalation of care considered, and ultimately not performed:diagnostic imaging    Barriers to care at this time, including but not limited to:  None .     Decision tools and prescription drugs considered including, but not limited to: Antibiotics amoxicillin prescribed .    The patient will return for new or worsening symptoms and is stable at the time of discharge.    The patient is referred to a primary physician for blood pressure management, diabetic screening, and for all other preventative health concerns.      DISPOSITION:  Patient will be discharged home in stable condition.    FOLLOW UP:  [facility], Emergency Dept  [address] [facility] [address] [phone]    If symptoms worsen      OUTPATIENT MEDICATIONS:  New Prescriptions    AMOXICILLIN (AMOXIL) 500 MG CAP    Take 2 Capsules by mouth every day for 10 days.         FINAL DIAGNOSIS  1. Streptococcal pharyngitis          Electronically signed by: Nik Nuñez M.D., 9/14/2024 11:16 AM

## 2025-02-17 ENCOUNTER — HOSPITAL ENCOUNTER (EMERGENCY)
Facility: MEDICAL CENTER | Age: 25
End: 2025-02-17
Attending: EMERGENCY MEDICINE
Payer: COMMERCIAL

## 2025-02-17 VITALS
HEIGHT: 61 IN | DIASTOLIC BLOOD PRESSURE: 74 MMHG | WEIGHT: 246.91 LBS | RESPIRATION RATE: 16 BRPM | TEMPERATURE: 97.5 F | SYSTOLIC BLOOD PRESSURE: 121 MMHG | OXYGEN SATURATION: 99 % | BODY MASS INDEX: 46.62 KG/M2 | HEART RATE: 88 BPM

## 2025-02-17 DIAGNOSIS — S50.862A INSECT BITE OF LEFT FOREARM, INITIAL ENCOUNTER: ICD-10-CM
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
## 2025-02-17 PROCEDURE — A9270 NON-COVERED ITEM OR SERVICE: HCPCS | Performed by: EMERGENCY MEDICINE

## 2025-02-17 PROCEDURE — 700102 HCHG RX REV CODE 250 W/ 637 OVERRIDE(OP): Performed by: EMERGENCY MEDICINE

## 2025-02-17 PROCEDURE — 99283 EMERGENCY DEPT VISIT LOW MDM: CPT

## 2025-02-17 RX ORDER — DIPHENHYDRAMINE HCL 25 MG
25 TABLET ORAL ONCE
Status: COMPLETED | OUTPATIENT
Start: 2025-02-17 | End: 2025-02-17

## 2025-02-17 RX ORDER — IBUPROFEN 600 MG/1
600 TABLET, FILM COATED ORAL ONCE
Status: COMPLETED | OUTPATIENT
Start: 2025-02-17 | End: 2025-02-17

## 2025-02-17 RX ORDER — IBUPROFEN 600 MG/1
600 TABLET, FILM COATED ORAL EVERY 6 HOURS PRN
Qty: 30 TABLET | Refills: 0 | Status: SHIPPED | OUTPATIENT
Start: 2025-02-17

## 2025-02-17 RX ORDER — DIPHENHYDRAMINE HCL 25 MG
25 CAPSULE ORAL EVERY 6 HOURS PRN
Qty: 12 CAPSULE | Refills: 0 | Status: SHIPPED | OUTPATIENT
Start: 2025-02-17 | End: 2025-02-20

## 2025-02-17 RX ADMIN — IBUPROFEN 600 MG: 600 TABLET, FILM COATED ORAL at 16:29

## 2025-02-17 RX ADMIN — DIPHENHYDRAMINE HYDROCHLORIDE 25 MG: 25 TABLET ORAL at 16:29

## 2025-02-17 ASSESSMENT — PAIN DESCRIPTION - PAIN TYPE: TYPE: ACUTE PAIN

## 2025-02-17 ASSESSMENT — FIBROSIS 4 INDEX: FIB4 SCORE: 0.35

## 2025-02-17 NOTE — LETTER
"    EMPLOYEE’S CLAIM FOR COMPENSATION/ REPORT OF INITIAL TREATMENT  FORM C-4  PLEASE TYPE OR PRINT    EMPLOYEE’S CLAIM - PROVIDE ALL INFORMATION REQUESTED   First Name                    JOSE Andre                  Last Name  Niko Milner Birthdate                    2000                Sex  Female Claim Number (Insurer’s Use Only)     Home Address  [address] Age  24 y.o. 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 (Be specific and answer in detail. Use additional sheet if necessary)  Estaba serando el banl cuando senti algo meardio enlamano   If you believe that you have an occupational disease, when did you first have knowledge of the disability and its relationship to your employment?  N/A Witnesses to the Accident (if applicable)  None      Nature of Injury or Occupational Disease  Workers' Compensation  Part(s) of Body " Injured or Affected  Lower Arm (L)     I CERTIFY THAT THE ABOVE IS TRUE AND CORRECT TO T HE BEST OF MY KNOWLEDGE AND THAT I HAVE PROVIDED THIS INFORMATION IN ORDER TO OBTAIN THE BENEFITS OF [place]CC[place], OR [place]).  I HEREBY AUTHORIZE ANY PHYSICIAN, CHIROPRACTOR, SURGEON, PRACTITIONER OR ANY OTHER PERSON, ANY HOSPITAL, INCLUDING [facility] OR [facility], ANY  MEDICAL SERVICE ORGANIZATION, ANY INSURANCE COMPANY, OR OTHER INSTITUTION OR ORGANIZATION TO RELEASE TO EACH OTHER, ANY MEDICAL OR OTHER INFORMATION, INCLUDING BENEFITS PAID OR PAYABLE, PERTINENT TO THIS INJURY OR DISEASE, EXCEPT INFORMATION RELATIVE TO DIAGNOSIS, TREATMENT AND/OR COUNSELING FOR AIDS, PSYCHOLOGICAL CONDITIONS, ALCOHOL OR CONTROLLED SUBSTANCES, FOR WHICH I MUST GIVE SPECIFIC AUTHORIZATION.  A PHOTOSTAT OF THIS AUTHORIZATION SHALL BE VALID AS THE ORIGINAL.     Date     02/17/2025   Place     [facility] Employee’s Original or  *Electronic Signature   THIS REPORT MUST BE COMPLETED AND MAILED WITHIN 3 WORKING DAYS OF TREATMENT   Place  [facility], EMERGENCY DEPT    Name of Facility  [facility] EMERGENCY DEPARTMENT   Date 2/17/2025 Diagnosis and Description of Injury or Occupational Disease  (S50.862A,  W57.XXXA) Insect bite of left forearm, initial encounter  The encounter diagnosis was Insect bite of left forearm, initial encounter. Is there evidence that the injured employee was under the influence of alcohol and/or another controlled substance at the time of accident?  [x]No  [] Yes (if yes, please explain)   Hour 1508  No   Treatment: Patient was seen in the emergency department she does have an inflammatory finding in the left forearm which could be an insect bite.  There is no signs of significant erythema this is most likely just an envenomation with inflammation.'s recommended follow-up with occupational health in 2 days  for recheck return if any symptoms worsen.    Have you advised the patient to remain off work five days or more?   [] Yes  [x] No        No           If yes, indicate dates: From   To      If no, is the injured employee capable of: [x] full duty Yes   [] modified duty    If yes to modified duty, specify any limitations / restrictions:       X-Ray Findings:      From information given by the employee, together with medical evidence, can you directly connect this injury or occupational disease as job incurred?  []Yes   [] No      Is additional medical care by a physician indicated? [x]Yes [] No  Yes    Do you know of any previous injury or disease contributing to this condition or occupational disease? []Yes [x] No (Explain if yes)                          No   Date  2/17/2025 Print Health Care Provider’s Name  BERNY EDWARDS M.D. I certify that the employer’s copy of  this form was delivered to the employer on:   Address   [address] [place] [address] INSURER'S USE ONLY                       [facility] Zip   [address] Provider’s Tax ID Number  [id number]   Telephone  Dept: [phone]    Health Care Provider’s Original or Electronic Signature  e-SignSBERNY MORSE M.D. Degree (MD,DO, DC,PA-C,APRN)  {Provider Degrees:25380}  MD  Choose (if applicable)      ORIGINAL - TREATING HEALTHCARE PROVIDER PAGE 2 - INSURER/TPA PAGE 3 - EMPLOYER PAGE 4 - EMPLOYEE             Form C-4 (rev.08/23)

## 2025-02-17 NOTE — ED TRIAGE NOTES
Jes Milner  24 y.o. female  Chief Complaint   Patient presents with    Bug Bite     Left forearm unknown insect bite while at work around 1300 today.      Pt ambulatory to triage with steady gait for above complaint.     Pt is [employer] 15, speaking in full sentences, follows commands and responds appropriately to questions. Resp are even and unlabored.     Pt placed in ED lobby. Pt educated on triage process. Pt encouraged to alert staff for any changes.       Vitals:    02/17/25 1510   BP: 126/78   Pulse: 93   Resp: 16   Temp: 36.3 °C (97.3 °F)   SpO2: 98%

## 2025-02-18 NOTE — ED PROVIDER NOTES
CHIEF COMPLAINT  Chief Complaint   Patient presents with    Bug Bite     Left forearm unknown insect bite while at work around 1300 today.        LIMITATION TO HISTORY   Select: none    HPI    Jes Milner is a 24 y.o. female who presents to the Emergency Department complaining of left forearm pain and some swelling.  The patient does work in housekeeping and about 1:00 she is acutely felt an area of her arm that was getting a little painful and then an area started welting.  She is not sure if she was bit by a bug.  The patient presents emerged part for evaluation.  She denies any fevers chills describes the areas being slightly itchy denies any other symptoms.    OUTSIDE HISTORIAN(S):  Select: None    EXTERNAL RECORDS REVIEWED  Select: Other no significant records within the EMR      PAST MEDICAL HISTORY  No past medical history on file.  .    SURGICAL HISTORY  No past surgical history on file.      FAMILY HISTORY  Family History   Problem Relation Age of Onset    No Known Problems Mother     No Known Problems Father     No Known Problems Sister     No Known Problems Brother     No Known Problems Brother           SOCIAL HISTORY  Social History     Socioeconomic History    Marital status: Single     Spouse name: Not on file    Number of children: Not on file    Years of education: Not on file    Highest education level: Not on file   Occupational History    Not on file   Tobacco Use    Smoking status: Never    Smokeless tobacco: Never   Vaping Use    Vaping status: Never Used   Substance and Sexual Activity    Alcohol use: Not Currently    Drug use: Never    Sexual activity: Yes     Partners: Male     Birth control/protection: Pill   Other Topics Concern    Not on file   Social History Narrative    Not on file     Social Drivers of Health     Financial Resource Strain: Not on file   Food Insecurity: Not on file   Transportation Needs: Not on file   Physical Activity: Not on file   Stress: Not  "on file   Social Connections: Not on file   Intimate Partner Violence: Not on file   Housing Stability: Not on file         CURRENT MEDICATIONS  No current facility-administered medications on file prior to encounter.     Current Outpatient Medications on File Prior to Encounter   Medication Sig Dispense Refill    dicyclomine (BENTYL) 20 MG Tab Take 1 Tablet by mouth every 6 hours. 60 Tablet 0    loperamide (IMODIUM) 2 MG Cap Take 1 Capsule by mouth 4 times a day as needed for Diarrhea. 30 Capsule 0    ondansetron (ZOFRAN ODT) 4 MG TABLET DISPERSIBLE Dissolve 1 Tablet by mouth every 6 hours as needed for Nausea/Vomiting. 10 Tablet 0    omeprazole (PRILOSEC) 20 MG delayed-release capsule Take 1 Capsule by mouth every day. 30 Capsule 0    metroNIDAZOLE (FLAGYL) 500 MG Tab Take 1 Tablet by mouth 2 times a day. 14 Tablet 0    Norgestim-Eth Estrad Triphasic 0.18/0.215/0.25 MG-25 MCG Tab Take 1 Tablet by mouth every day. 28 Tablet 12    ibuprofen (MOTRIN) 800 MG Tab Take 1 Tablet by mouth every 8 hours as needed for Mild Pain, Moderate Pain, Headache or Inflammation. 60 Tablet 2    ferrous sulfate 325 (65 Fe) MG tablet Take 1 Tablet by mouth 2 times a day. 60 Tablet 3    Prenatal MV-Min-Fe Fum-FA-DHA (PRENATAL 1 PO) Take  by mouth.             ALLERGIES  No Known Allergies    PHYSICAL EXAM  VITAL SIGNS:/78   Pulse 93   Temp 36.3 °C (97.3 °F) (Temporal)   Resp 16   Ht 1.549 m (5' 1\")   Wt 112 kg (246 lb 14.6 oz)   LMP 02/16/2025 (Approximate)   SpO2 98%   BMI 46.65 kg/m²     Constitutional: Well-developed no acute distress   HENT: Normocephalic, Atraumatic, Bilateral external ears normal.  Neck: Supple.  Nontender midline  Thorax & Lungs: Chest wall is nontender.  Abdomen: Soft, nontender  Skin: Warm, Dry, No erythema,   Back: No tenderness,  Musculoskeletal: Patient does have an area that is approximately 3 cm in diameter that slightly erythematous.  Nontender no signs of abscess.  Neurologic: Alert & " oriented x 3, distal sensation is intact  Psychiatric: Affect normal, Judgment normal, Mood normal.       DIAGNOSTIC STUDIES / PROCEDURES      COURSE & MEDICAL DECISION MAKING    ED COURSE:    ED Observation Status?  No, the patient does not meet observation criteria.  INTERVENTIONS BY ME:  Medications   diphenhydrAMINE (Benadryl) tablet/capsule 25 mg (has no administration in time range)   ibuprofen (Motrin) tablet 600 mg (has no administration in time range)           INITIAL ASSESSMENT, COURSE AND PLAN  Care Narrative: Patient is describing more of an itchiness and discomfort to the area so does appear to be more of a envenomation type pattern.  I recommended cool compresses but Benadryl and ibuprofen.  The patient is to follow-up with occupational health in 2 days for recheck return if any symptoms worsen.  Do not feel it is a infection.  I do feel it is more like an envenomation.        ADDITIONAL PROBLEM LIST  None  DISPOSITION AND DISCUSSIONS    I have discussed management of the patient with the following physicians and DIMPLE's: None    Escalation of care considered, and ultimately not performed: None    Barriers to care at this time, including but not limited to: None.     Decision tools and prescription drugs considered including, but not limited to: Recommended OTC medications for pain control as well as the description as above     FINAL DIAGNOSIS  1. Insect bite of left forearm, initial encounter           The patient will return for new or worsening symptoms and is stable at the time of discharge.    The patient is referred to a primary physician for blood pressure management, diabetic screening, and for all other preventative health concerns.        DISPOSITION:  Patient will be discharged home in stable condition.    FOLLOW UP:  [address] [address] [phone]          OUTPATIENT MEDICATIONS:  New Prescriptions    DIPHENHYDRAMINE (BENADRYL) 25 MG CAPSULE    Take 1  Capsule by mouth every 6 hours as needed for Itching for up to 3 days.    IBUPROFEN (MOTRIN) 600 MG TAB    Take 1 Tablet by mouth every 6 hours as needed for Moderate Pain.             Electronically signed by: Christian Perez M.D.,4:22 PM 02/17/25

## 2025-02-18 NOTE — ED NOTES
Written and verbal instructions provided to pt. Pt instructed to follow up with PCP and  medications from pharmacy. Pt instructed to return to emergency department for new or worsening symptoms. Pt verbalized understanding of discharge instructions. Pt ambulatory upon discharge with all belongings.   utilized to communicate discharge instructions.

## 2025-04-24 ENCOUNTER — NON-PROVIDER VISIT (OUTPATIENT)
Dept: OCCUPATIONAL MEDICINE | Facility: CLINIC | Age: 25
End: 2025-04-24

## 2025-04-24 DIAGNOSIS — Z02.1 PRE-EMPLOYMENT DRUG SCREENING: ICD-10-CM

## 2025-04-24 LAB
AMP AMPHETAMINE: NORMAL
COC COCAINE: NORMAL
INT CON NEG: NORMAL
INT CON POS: NORMAL
MET METHAMPHETAMINES: NORMAL
OPI OPIATES: NORMAL
PCP PHENCYCLIDINE: NORMAL
POC DRUG COMMENT 753798-OCCUPATIONAL HEALTH: NORMAL
THC: NORMAL

## 2025-04-24 PROCEDURE — 80305 DRUG TEST PRSMV DIR OPT OBS: CPT | Performed by: FAMILY MEDICINE
